# Patient Record
Sex: MALE | Race: WHITE | NOT HISPANIC OR LATINO | Employment: FULL TIME | URBAN - NONMETROPOLITAN AREA
[De-identification: names, ages, dates, MRNs, and addresses within clinical notes are randomized per-mention and may not be internally consistent; named-entity substitution may affect disease eponyms.]

---

## 2024-03-19 ENCOUNTER — OFFICE VISIT (OUTPATIENT)
Dept: URGENT CARE | Facility: CLINIC | Age: 39
End: 2024-03-19
Payer: COMMERCIAL

## 2024-03-19 VITALS
DIASTOLIC BLOOD PRESSURE: 96 MMHG | HEART RATE: 104 BPM | TEMPERATURE: 98.5 F | RESPIRATION RATE: 16 BRPM | SYSTOLIC BLOOD PRESSURE: 145 MMHG | OXYGEN SATURATION: 97 % | BODY MASS INDEX: 31.39 KG/M2 | WEIGHT: 200 LBS | HEIGHT: 67 IN

## 2024-03-19 DIAGNOSIS — A08.4 VIRAL GASTROENTERITIS: Primary | ICD-10-CM

## 2024-03-19 LAB
POC APPEARANCE, URINE: CLEAR
POC BILIRUBIN, URINE: ABNORMAL
POC BLOOD, URINE: NEGATIVE
POC COLOR, URINE: YELLOW
POC GLUCOSE, URINE: NEGATIVE MG/DL
POC KETONES, URINE: ABNORMAL MG/DL
POC LEUKOCYTES, URINE: NEGATIVE
POC NITRITE,URINE: NEGATIVE
POC PH, URINE: 7 PH
POC PROTEIN, URINE: ABNORMAL MG/DL
POC SPECIFIC GRAVITY, URINE: 1.02
POC UROBILINOGEN, URINE: 4 EU/DL

## 2024-03-19 PROCEDURE — 81002 URINALYSIS NONAUTO W/O SCOPE: CPT | Performed by: NURSE PRACTITIONER

## 2024-03-19 PROCEDURE — 99213 OFFICE O/P EST LOW 20 MIN: CPT | Performed by: NURSE PRACTITIONER

## 2024-03-19 NOTE — PROGRESS NOTES
38 y.o. male presents for evaluation of diarrhea and intermittent abdominal pain that began 2 days ago. States he had low grade fever at onset but has resolved today. Denies persistent pain. On Sunday he states he was straining to have a BM as he was bloated and then developed a pain to his left lower abdomen that comes and goes. He states he has generalized pain that begins just prior to BM and resolves with BM. He denies melena, vomiting, URI symptoms or any other associated symptoms. Has been able to tolerate fluids but states intake has been down. No otc meds for symptoms. No other complaints.    Vitals:    03/19/24 0956   BP: (!) 145/96   Pulse: 104   Resp: 16   Temp: 36.9 °C (98.5 °F)   SpO2: 97%       No Known Allergies    Medication Documentation Review Audit       Reviewed by Janiya Crawford MA (Medical Assistant) on 03/19/24 at 0955      Medication Order Taking? Sig Documenting Provider Last Dose Status            No Medications to Display                                   No past medical history on file.    No past surgical history on file.    ROS  See HPI    Physical Exam  Vitals and nursing note reviewed.   Constitutional:       General: He is not in acute distress.     Appearance: Normal appearance. He is not ill-appearing, toxic-appearing or diaphoretic.   HENT:      Head: Normocephalic and atraumatic.   Cardiovascular:      Rate and Rhythm: Regular rhythm. Tachycardia present.   Abdominal:      General: Bowel sounds are increased.      Palpations: Abdomen is soft.      Tenderness: There is generalized abdominal tenderness. There is no right CVA tenderness or left CVA tenderness. Negative signs include Mcallister's sign and McBurney's sign.      Hernia: No hernia is present.   Skin:     General: Skin is warm and dry.   Neurological:      General: No focal deficit present.      Mental Status: He is alert and oriented to person, place, and time.   Psychiatric:         Mood and Affect: Mood normal.          Behavior: Behavior normal.       Recent Results (from the past 1 hour(s))   POCT UA (nonautomated w/o microscopy) manually resulted    Collection Time: 03/19/24 10:29 AM   Result Value Ref Range    POC Color, Urine Yellow Straw, Yellow, Light-Yellow    POC Appearance, Urine Clear Clear    POC Glucose, Urine NEGATIVE NEGATIVE mg/dl    POC Bilirubin, Urine SMALL (1+) (A) NEGATIVE    POC Ketones, Urine TRACE (A) NEGATIVE mg/dl    POC Specific Gravity, Urine 1.020 1.005 - 1.035    POC Blood, Urine NEGATIVE NEGATIVE    POC PH, Urine 7.0 No Reference Range Established PH    POC Protein, Urine 100 (2+) (A) NEGATIVE, 30 (1+) mg/dl    POC Urobilinogen, Urine 4.0 (A) 0.2, 1.0 EU/DL    Poc Nitrite, Urine NEGATIVE NEGATIVE    POC Leukocytes, Urine NEGATIVE NEGATIVE     Assessment/Plan/MDM  Ramesh was seen today for diarrhea.  Diagnoses and all orders for this visit:  Viral gastroenteritis (Primary)  -     POCT UA (nonautomated w/o microscopy) manually resulted  -     Urine Culture    Advised patient and wife if symptoms become persistent, worsen, fever returns, or any new symptoms arise to go to ED for further eval. They agreed with plan. Patient's clinical presentation is otherwise unremarkable at this time. Patient is discharged with instructions to follow-up with primary care or seek emergency medical attention for worsening symptoms or any new concerns.      Lester Loya, CNP  [facility]  [phone]

## 2024-03-21 ENCOUNTER — APPOINTMENT (OUTPATIENT)
Dept: RADIOLOGY | Facility: HOSPITAL | Age: 39
DRG: 391 | End: 2024-03-21
Payer: COMMERCIAL

## 2024-03-21 ENCOUNTER — HOSPITAL ENCOUNTER (INPATIENT)
Facility: HOSPITAL | Age: 39
LOS: 5 days | Discharge: HOME | DRG: 391 | End: 2024-03-26
Attending: EMERGENCY MEDICINE
Payer: COMMERCIAL

## 2024-03-21 DIAGNOSIS — K57.20 PERFORATION OF SIGMOID COLON DUE TO DIVERTICULITIS: ICD-10-CM

## 2024-03-21 DIAGNOSIS — K65.1 INTRA-ABDOMINAL ABSCESS (MULTI): Primary | ICD-10-CM

## 2024-03-21 LAB
ALBUMIN SERPL BCP-MCNC: 3.9 G/DL (ref 3.4–5)
ALP SERPL-CCNC: 69 U/L (ref 33–120)
ALT SERPL W P-5'-P-CCNC: 30 U/L (ref 10–52)
ANION GAP SERPL CALC-SCNC: 14 MMOL/L (ref 10–20)
APPEARANCE UR: ABNORMAL
AST SERPL W P-5'-P-CCNC: 29 U/L (ref 9–39)
BACTERIA #/AREA URNS AUTO: ABNORMAL /HPF
BASOPHILS # BLD AUTO: 0.02 X10*3/UL (ref 0–0.1)
BASOPHILS NFR BLD AUTO: 0.1 %
BILIRUB SERPL-MCNC: 1 MG/DL (ref 0–1.2)
BILIRUB UR STRIP.AUTO-MCNC: NEGATIVE MG/DL
BUN SERPL-MCNC: 11 MG/DL (ref 6–23)
CALCIUM SERPL-MCNC: 8.9 MG/DL (ref 8.6–10.3)
CHLORIDE SERPL-SCNC: 100 MMOL/L (ref 98–107)
CO2 SERPL-SCNC: 21 MMOL/L (ref 21–32)
COLOR UR: YELLOW
CREAT SERPL-MCNC: 0.72 MG/DL (ref 0.5–1.3)
EGFRCR SERPLBLD CKD-EPI 2021: >90 ML/MIN/1.73M*2
EOSINOPHIL # BLD AUTO: 0.03 X10*3/UL (ref 0–0.7)
EOSINOPHIL NFR BLD AUTO: 0.2 %
ERYTHROCYTE [DISTWIDTH] IN BLOOD BY AUTOMATED COUNT: 11.1 % (ref 11.5–14.5)
GLUCOSE SERPL-MCNC: 97 MG/DL (ref 74–99)
GLUCOSE UR STRIP.AUTO-MCNC: NEGATIVE MG/DL
HCT VFR BLD AUTO: 44.9 % (ref 41–52)
HGB BLD-MCNC: 16.2 G/DL (ref 13.5–17.5)
HOLD SPECIMEN: NORMAL
HYALINE CASTS #/AREA URNS AUTO: ABNORMAL /LPF
IMM GRANULOCYTES # BLD AUTO: 0.06 X10*3/UL (ref 0–0.7)
IMM GRANULOCYTES NFR BLD AUTO: 0.4 % (ref 0–0.9)
KETONES UR STRIP.AUTO-MCNC: ABNORMAL MG/DL
LACTATE SERPL-SCNC: 1 MMOL/L (ref 0.4–2)
LEUKOCYTE ESTERASE UR QL STRIP.AUTO: NEGATIVE
LIPASE SERPL-CCNC: 8 U/L (ref 9–82)
LYMPHOCYTES # BLD AUTO: 1.32 X10*3/UL (ref 1.2–4.8)
LYMPHOCYTES NFR BLD AUTO: 8.4 %
MCH RBC QN AUTO: 32.7 PG (ref 26–34)
MCHC RBC AUTO-ENTMCNC: 36.1 G/DL (ref 32–36)
MCV RBC AUTO: 91 FL (ref 80–100)
MONOCYTES # BLD AUTO: 1.24 X10*3/UL (ref 0.1–1)
MONOCYTES NFR BLD AUTO: 7.9 %
MUCOUS THREADS #/AREA URNS AUTO: ABNORMAL /LPF
NEUTROPHILS # BLD AUTO: 12.99 X10*3/UL (ref 1.2–7.7)
NEUTROPHILS NFR BLD AUTO: 83 %
NITRITE UR QL STRIP.AUTO: NEGATIVE
NRBC BLD-RTO: 0 /100 WBCS (ref 0–0)
PH UR STRIP.AUTO: 6 [PH]
PLATELET # BLD AUTO: 269 X10*3/UL (ref 150–450)
POTASSIUM SERPL-SCNC: 3.3 MMOL/L (ref 3.5–5.3)
PROT SERPL-MCNC: 8 G/DL (ref 6.4–8.2)
PROT UR STRIP.AUTO-MCNC: ABNORMAL MG/DL
RBC # BLD AUTO: 4.95 X10*6/UL (ref 4.5–5.9)
RBC # UR STRIP.AUTO: NEGATIVE /UL
RBC #/AREA URNS AUTO: ABNORMAL /HPF
SODIUM SERPL-SCNC: 132 MMOL/L (ref 136–145)
SP GR UR STRIP.AUTO: 1.02
UROBILINOGEN UR STRIP.AUTO-MCNC: 4 MG/DL
WBC # BLD AUTO: 15.7 X10*3/UL (ref 4.4–11.3)
WBC #/AREA URNS AUTO: ABNORMAL /HPF

## 2024-03-21 PROCEDURE — 74177 CT ABD & PELVIS W/CONTRAST: CPT | Performed by: RADIOLOGY

## 2024-03-21 PROCEDURE — 83690 ASSAY OF LIPASE: CPT | Performed by: EMERGENCY MEDICINE

## 2024-03-21 PROCEDURE — 99285 EMERGENCY DEPT VISIT HI MDM: CPT | Mod: 25

## 2024-03-21 PROCEDURE — 2500000004 HC RX 250 GENERAL PHARMACY W/ HCPCS (ALT 636 FOR OP/ED)

## 2024-03-21 PROCEDURE — 36415 COLL VENOUS BLD VENIPUNCTURE: CPT | Performed by: EMERGENCY MEDICINE

## 2024-03-21 PROCEDURE — 85025 COMPLETE CBC W/AUTO DIFF WBC: CPT | Performed by: EMERGENCY MEDICINE

## 2024-03-21 PROCEDURE — 96375 TX/PRO/DX INJ NEW DRUG ADDON: CPT

## 2024-03-21 PROCEDURE — 74177 CT ABD & PELVIS W/CONTRAST: CPT

## 2024-03-21 PROCEDURE — 2550000001 HC RX 255 CONTRASTS: Performed by: EMERGENCY MEDICINE

## 2024-03-21 PROCEDURE — 80053 COMPREHEN METABOLIC PANEL: CPT | Performed by: EMERGENCY MEDICINE

## 2024-03-21 PROCEDURE — 99222 1ST HOSP IP/OBS MODERATE 55: CPT | Performed by: SURGERY

## 2024-03-21 PROCEDURE — 83605 ASSAY OF LACTIC ACID: CPT | Performed by: EMERGENCY MEDICINE

## 2024-03-21 PROCEDURE — 2500000004 HC RX 250 GENERAL PHARMACY W/ HCPCS (ALT 636 FOR OP/ED): Performed by: EMERGENCY MEDICINE

## 2024-03-21 PROCEDURE — 96365 THER/PROPH/DIAG IV INF INIT: CPT | Mod: 59

## 2024-03-21 PROCEDURE — 1100000001 HC PRIVATE ROOM DAILY

## 2024-03-21 PROCEDURE — 81001 URINALYSIS AUTO W/SCOPE: CPT | Performed by: EMERGENCY MEDICINE

## 2024-03-21 PROCEDURE — 99222 1ST HOSP IP/OBS MODERATE 55: CPT

## 2024-03-21 RX ORDER — ENOXAPARIN SODIUM 100 MG/ML
40 INJECTION SUBCUTANEOUS EVERY 24 HOURS
Status: DISCONTINUED | OUTPATIENT
Start: 2024-03-21 | End: 2024-03-26 | Stop reason: HOSPADM

## 2024-03-21 RX ORDER — PANTOPRAZOLE SODIUM 40 MG/10ML
40 INJECTION, POWDER, LYOPHILIZED, FOR SOLUTION INTRAVENOUS
Status: DISCONTINUED | OUTPATIENT
Start: 2024-03-22 | End: 2024-03-26 | Stop reason: HOSPADM

## 2024-03-21 RX ORDER — ONDANSETRON HYDROCHLORIDE 2 MG/ML
4 INJECTION, SOLUTION INTRAVENOUS ONCE
Status: COMPLETED | OUTPATIENT
Start: 2024-03-21 | End: 2024-03-21

## 2024-03-21 RX ORDER — ACETAMINOPHEN 325 MG/1
650 TABLET ORAL EVERY 4 HOURS PRN
Status: DISCONTINUED | OUTPATIENT
Start: 2024-03-21 | End: 2024-03-26 | Stop reason: HOSPADM

## 2024-03-21 RX ORDER — SODIUM CHLORIDE 9 MG/ML
150 INJECTION, SOLUTION INTRAVENOUS CONTINUOUS
Status: DISCONTINUED | OUTPATIENT
Start: 2024-03-21 | End: 2024-03-21

## 2024-03-21 RX ORDER — MORPHINE SULFATE 4 MG/ML
4 INJECTION, SOLUTION INTRAMUSCULAR; INTRAVENOUS ONCE
Status: COMPLETED | OUTPATIENT
Start: 2024-03-21 | End: 2024-03-21

## 2024-03-21 RX ORDER — ONDANSETRON 4 MG/1
4 TABLET, ORALLY DISINTEGRATING ORAL EVERY 8 HOURS PRN
Status: DISCONTINUED | OUTPATIENT
Start: 2024-03-21 | End: 2024-03-26 | Stop reason: HOSPADM

## 2024-03-21 RX ORDER — POTASSIUM CHLORIDE 14.9 MG/ML
20 INJECTION INTRAVENOUS
Status: COMPLETED | OUTPATIENT
Start: 2024-03-21 | End: 2024-03-21

## 2024-03-21 RX ORDER — MORPHINE SULFATE 2 MG/ML
2 INJECTION, SOLUTION INTRAMUSCULAR; INTRAVENOUS EVERY 4 HOURS PRN
Status: DISCONTINUED | OUTPATIENT
Start: 2024-03-21 | End: 2024-03-26 | Stop reason: HOSPADM

## 2024-03-21 RX ORDER — SODIUM CHLORIDE 9 MG/ML
125 INJECTION, SOLUTION INTRAVENOUS CONTINUOUS
Status: DISCONTINUED | OUTPATIENT
Start: 2024-03-21 | End: 2024-03-25

## 2024-03-21 RX ORDER — ONDANSETRON HYDROCHLORIDE 2 MG/ML
4 INJECTION, SOLUTION INTRAVENOUS EVERY 8 HOURS PRN
Status: DISCONTINUED | OUTPATIENT
Start: 2024-03-21 | End: 2024-03-26 | Stop reason: HOSPADM

## 2024-03-21 RX ORDER — KETOROLAC TROMETHAMINE 30 MG/ML
30 INJECTION, SOLUTION INTRAMUSCULAR; INTRAVENOUS EVERY 6 HOURS PRN
Status: DISPENSED | OUTPATIENT
Start: 2024-03-21 | End: 2024-03-26

## 2024-03-21 RX ADMIN — SODIUM CHLORIDE 125 ML/HR: 9 INJECTION, SOLUTION INTRAVENOUS at 19:24

## 2024-03-21 RX ADMIN — IOHEXOL 75 ML: 350 INJECTION, SOLUTION INTRAVENOUS at 09:51

## 2024-03-21 RX ADMIN — ONDANSETRON 4 MG: 2 INJECTION INTRAMUSCULAR; INTRAVENOUS at 08:48

## 2024-03-21 RX ADMIN — POTASSIUM CHLORIDE 20 MEQ: 14.9 INJECTION, SOLUTION INTRAVENOUS at 12:37

## 2024-03-21 RX ADMIN — PIPERACILLIN SODIUM AND TAZOBACTAM SODIUM 3.38 G: 3; .375 INJECTION, SOLUTION INTRAVENOUS at 10:30

## 2024-03-21 RX ADMIN — MORPHINE SULFATE 4 MG: 4 INJECTION, SOLUTION INTRAMUSCULAR; INTRAVENOUS at 08:48

## 2024-03-21 RX ADMIN — PIPERACILLIN SODIUM AND TAZOBACTAM SODIUM 3.38 G: 3; .375 INJECTION, SOLUTION INTRAVENOUS at 17:15

## 2024-03-21 RX ADMIN — SODIUM CHLORIDE 150 ML/HR: 9 INJECTION, SOLUTION INTRAVENOUS at 08:40

## 2024-03-21 RX ADMIN — KETOROLAC TROMETHAMINE 30 MG: 30 INJECTION, SOLUTION INTRAMUSCULAR; INTRAVENOUS at 15:32

## 2024-03-21 RX ADMIN — PIPERACILLIN SODIUM AND TAZOBACTAM SODIUM 3.38 G: 3; .375 INJECTION, SOLUTION INTRAVENOUS at 22:22

## 2024-03-21 RX ADMIN — POTASSIUM CHLORIDE 20 MEQ: 14.9 INJECTION, SOLUTION INTRAVENOUS at 14:21

## 2024-03-21 RX ADMIN — SODIUM CHLORIDE 1000 ML: 9 INJECTION, SOLUTION INTRAVENOUS at 08:49

## 2024-03-21 SDOH — SOCIAL STABILITY: SOCIAL INSECURITY: DO YOU FEEL ANYONE HAS EXPLOITED OR TAKEN ADVANTAGE OF YOU FINANCIALLY OR OF YOUR PERSONAL PROPERTY?: NO

## 2024-03-21 SDOH — SOCIAL STABILITY: SOCIAL INSECURITY: ARE YOU OR HAVE YOU BEEN THREATENED OR ABUSED PHYSICALLY, EMOTIONALLY, OR SEXUALLY BY ANYONE?: NO

## 2024-03-21 SDOH — SOCIAL STABILITY: SOCIAL INSECURITY: HAVE YOU HAD THOUGHTS OF HARMING ANYONE ELSE?: NO

## 2024-03-21 SDOH — SOCIAL STABILITY: SOCIAL INSECURITY: HAS ANYONE EVER THREATENED TO HURT YOUR FAMILY OR YOUR PETS?: NO

## 2024-03-21 SDOH — SOCIAL STABILITY: SOCIAL INSECURITY: ABUSE: ADULT

## 2024-03-21 SDOH — SOCIAL STABILITY: SOCIAL INSECURITY: ARE THERE ANY APPARENT SIGNS OF INJURIES/BEHAVIORS THAT COULD BE RELATED TO ABUSE/NEGLECT?: NO

## 2024-03-21 SDOH — SOCIAL STABILITY: SOCIAL INSECURITY: DOES ANYONE TRY TO KEEP YOU FROM HAVING/CONTACTING OTHER FRIENDS OR DOING THINGS OUTSIDE YOUR HOME?: NO

## 2024-03-21 SDOH — SOCIAL STABILITY: SOCIAL INSECURITY: DO YOU FEEL UNSAFE GOING BACK TO THE PLACE WHERE YOU ARE LIVING?: NO

## 2024-03-21 SDOH — SOCIAL STABILITY: SOCIAL INSECURITY: WERE YOU ABLE TO COMPLETE ALL THE BEHAVIORAL HEALTH SCREENINGS?: YES

## 2024-03-21 ASSESSMENT — PAIN DESCRIPTION - PAIN TYPE
TYPE: ACUTE PAIN

## 2024-03-21 ASSESSMENT — ENCOUNTER SYMPTOMS
PSYCHIATRIC NEGATIVE: 1
DIARRHEA: 1
NEUROLOGICAL NEGATIVE: 1
ABDOMINAL DISTENTION: 1
APPETITE CHANGE: 1
ABDOMINAL PAIN: 1
CARDIOVASCULAR NEGATIVE: 1
MUSCULOSKELETAL NEGATIVE: 1
RESPIRATORY NEGATIVE: 1

## 2024-03-21 ASSESSMENT — COGNITIVE AND FUNCTIONAL STATUS - GENERAL
MOBILITY SCORE: 24
DAILY ACTIVITIY SCORE: 24
PATIENT BASELINE BEDBOUND: NO
DAILY ACTIVITIY SCORE: 24
MOBILITY SCORE: 24

## 2024-03-21 ASSESSMENT — PAIN DESCRIPTION - DESCRIPTORS
DESCRIPTORS: ACHING

## 2024-03-21 ASSESSMENT — PAIN DESCRIPTION - LOCATION
LOCATION: ABDOMEN

## 2024-03-21 ASSESSMENT — PAIN SCALES - GENERAL
PAINLEVEL_OUTOF10: 2
PAINLEVEL_OUTOF10: 0 - NO PAIN
PAINLEVEL_OUTOF10: 5 - MODERATE PAIN
PAINLEVEL_OUTOF10: 6
PAINLEVEL_OUTOF10: 0 - NO PAIN

## 2024-03-21 ASSESSMENT — LIFESTYLE VARIABLES
HOW OFTEN DO YOU HAVE A DRINK CONTAINING ALCOHOL: 4 OR MORE TIMES A WEEK
SKIP TO QUESTIONS 9-10: 0
AUDIT-C TOTAL SCORE: 10
HOW OFTEN DO YOU HAVE 6 OR MORE DRINKS ON ONE OCCASION: DAILY OR ALMOST DAILY
AUDIT-C TOTAL SCORE: 10
HOW MANY STANDARD DRINKS CONTAINING ALCOHOL DO YOU HAVE ON A TYPICAL DAY: 5 OR 6

## 2024-03-21 ASSESSMENT — PAIN - FUNCTIONAL ASSESSMENT
PAIN_FUNCTIONAL_ASSESSMENT: 0-10

## 2024-03-21 ASSESSMENT — ACTIVITIES OF DAILY LIVING (ADL)
PATIENT'S MEMORY ADEQUATE TO SAFELY COMPLETE DAILY ACTIVITIES?: YES
GROOMING: INDEPENDENT
TOILETING: INDEPENDENT
ADEQUATE_TO_COMPLETE_ADL: YES
DRESSING YOURSELF: INDEPENDENT
BATHING: INDEPENDENT
HEARING - LEFT EAR: FUNCTIONAL
WALKS IN HOME: INDEPENDENT
HEARING - RIGHT EAR: FUNCTIONAL
JUDGMENT_ADEQUATE_SAFELY_COMPLETE_DAILY_ACTIVITIES: YES
LACK_OF_TRANSPORTATION: NO
ASSISTIVE_DEVICE: EYEGLASSES
FEEDING YOURSELF: INDEPENDENT

## 2024-03-21 ASSESSMENT — COLUMBIA-SUICIDE SEVERITY RATING SCALE - C-SSRS
1. IN THE PAST MONTH, HAVE YOU WISHED YOU WERE DEAD OR WISHED YOU COULD GO TO SLEEP AND NOT WAKE UP?: NO
6. HAVE YOU EVER DONE ANYTHING, STARTED TO DO ANYTHING, OR PREPARED TO DO ANYTHING TO END YOUR LIFE?: NO
2. HAVE YOU ACTUALLY HAD ANY THOUGHTS OF KILLING YOURSELF?: NO

## 2024-03-21 ASSESSMENT — PAIN DESCRIPTION - ORIENTATION
ORIENTATION: LOWER;MID

## 2024-03-21 ASSESSMENT — VISUAL ACUITY: OU: 1

## 2024-03-21 ASSESSMENT — PATIENT HEALTH QUESTIONNAIRE - PHQ9
2. FEELING DOWN, DEPRESSED OR HOPELESS: NOT AT ALL
1. LITTLE INTEREST OR PLEASURE IN DOING THINGS: NOT AT ALL
SUM OF ALL RESPONSES TO PHQ9 QUESTIONS 1 & 2: 0

## 2024-03-21 NOTE — ED PROVIDER NOTES
Abdominal pain.  This 38-year-old white male presents to the ED with complaints of abdominal pain symptoms that began on Saturday evening he states the pain has been relatively constant and most of the pain is located primarily in the left lower quadrant of the abdomen.  He states that with this abdominal pain he is having  the urge to have a bowel movement.  He has been treating himself with MiraLAX and ibuprofen with small bowel movements.  Patient states that when he has a bowel movement he does have some relief of his abdominal pain symptoms.  Though he states that the pain has been relatively constant.  Patient states that he has had issues with abdominal pain in the past with eating peanuts, pizza dough and barbecue meat.  He states that he will develop abdominal pain, fever and when she has a bowel movement his symptoms then resolved.  He denies ever having colonoscopy or seen a specialist for the symptoms previously.  He denies any history of diverticulitis.  Patient is noted to be hypertensive on arrival to the ED though he denies any endorgan symptoms.  He states that he has not been seen by primary care doctor for quite some time.  Denies any previous surgical history of the abdomen.            History provided by:  Patient   used: No         Physical Exam  Vitals and nursing note reviewed.   Constitutional:       General: He is awake.      Appearance: Normal appearance. He is overweight.   HENT:      Head: Normocephalic and atraumatic.      Right Ear: Hearing and external ear normal.      Left Ear: Hearing and external ear normal.      Nose: Nose normal. No congestion or rhinorrhea.      Mouth/Throat:      Lips: Pink.      Mouth: Mucous membranes are moist.      Pharynx: Oropharynx is clear. No oropharyngeal exudate or posterior oropharyngeal erythema.   Eyes:      General: Lids are normal. Vision grossly intact.         Right eye: No discharge.         Left eye: No discharge.       Extraocular Movements: Extraocular movements intact.      Conjunctiva/sclera: Conjunctivae normal.      Pupils: Pupils are equal, round, and reactive to light.   Cardiovascular:      Rate and Rhythm: Normal rate and regular rhythm.      Pulses: Normal pulses.      Heart sounds: Normal heart sounds. No murmur heard.     No friction rub. No gallop.   Pulmonary:      Effort: Pulmonary effort is normal. No respiratory distress.      Breath sounds: Normal breath sounds. No stridor. No wheezing, rhonchi or rales.   Chest:      Chest wall: No tenderness.   Abdominal:      General: Abdomen is protuberant. Bowel sounds are normal. There is no distension.      Palpations: Abdomen is soft. There is no mass.      Tenderness: There is abdominal tenderness in the suprapubic area and left lower quadrant. There is no guarding or rebound.      Hernia: No hernia is present.       Musculoskeletal:         General: No swelling, tenderness, deformity or signs of injury. Normal range of motion.      Cervical back: Full passive range of motion without pain, normal range of motion and neck supple.      Right lower leg: Normal. No edema.      Left lower leg: Normal. No edema.   Skin:     General: Skin is warm and dry.      Capillary Refill: Capillary refill takes less than 2 seconds.      Coloration: Skin is not jaundiced or pale.      Findings: No bruising, erythema, lesion or rash.   Neurological:      General: No focal deficit present.      Mental Status: He is alert and oriented to person, place, and time.      GCS: GCS eye subscore is 4. GCS verbal subscore is 5. GCS motor subscore is 6.      Cranial Nerves: Cranial nerves 2-12 are intact. No cranial nerve deficit.      Sensory: Sensation is intact. No sensory deficit.      Motor: Motor function is intact. No weakness.      Coordination: Coordination is intact. Coordination normal.      Deep Tendon Reflexes: Reflexes normal.   Psychiatric:         Attention and Perception: Attention  and perception normal.         Mood and Affect: Mood and affect normal.         Speech: Speech normal.         Behavior: Behavior normal. Behavior is cooperative.         Thought Content: Thought content normal.         Cognition and Memory: Cognition and memory normal.         Judgment: Judgment normal.          Labs Reviewed   LIPASE - Abnormal       Result Value    Lipase 8 (*)     Narrative:     Venipuncture immediately after or during the administration of Metamizole may lead to falsely low results. Testing should be performed immediately prior to Metamizole dosing.   COMPREHENSIVE METABOLIC PANEL - Abnormal    Glucose 97      Sodium 132 (*)     Potassium 3.3 (*)     Chloride 100      Bicarbonate 21      Anion Gap 14      Urea Nitrogen 11      Creatinine 0.72      eGFR >90      Calcium 8.9      Albumin 3.9      Alkaline Phosphatase 69      Total Protein 8.0      AST 29      Bilirubin, Total 1.0      ALT 30     CBC WITH AUTO DIFFERENTIAL - Abnormal    WBC 15.7 (*)     nRBC 0.0      RBC 4.95      Hemoglobin 16.2      Hematocrit 44.9      MCV 91      MCH 32.7      MCHC 36.1 (*)     RDW 11.1 (*)     Platelets 269      Neutrophils % 83.0      Immature Granulocytes %, Automated 0.4      Lymphocytes % 8.4      Monocytes % 7.9      Eosinophils % 0.2      Basophils % 0.1      Neutrophils Absolute 12.99 (*)     Immature Granulocytes Absolute, Automated 0.06      Lymphocytes Absolute 1.32      Monocytes Absolute 1.24 (*)     Eosinophils Absolute 0.03      Basophils Absolute 0.02     URINALYSIS WITH REFLEX CULTURE AND MICROSCOPIC - Abnormal    Color, Urine Yellow      Appearance, Urine Hazy (*)     Specific Gravity, Urine 1.018      pH, Urine 6.0      Protein, Urine 30 (1+) (*)     Glucose, Urine NEGATIVE      Blood, Urine NEGATIVE      Ketones, Urine 80 (2+) (*)     Bilirubin, Urine NEGATIVE      Urobilinogen, Urine 4.0 (*)     Nitrite, Urine NEGATIVE      Leukocyte Esterase, Urine NEGATIVE     URINALYSIS MICROSCOPIC WITH  REFLEX CULTURE - Abnormal    WBC, Urine 1-5      RBC, Urine 1-2      Bacteria, Urine 1+ (*)     Mucus, Urine 4+      Hyaline Casts, Urine 1+ (*)    LACTATE - Normal    Lactate 1.0      Narrative:     Venipuncture immediately after or during the administration of Metamizole may lead to falsely low results. Testing should be performed immediately  prior to Metamizole dosing.   URINALYSIS WITH REFLEX CULTURE AND MICROSCOPIC    Narrative:     The following orders were created for panel order Urinalysis with Reflex Culture and Microscopic.  Procedure                               Abnormality         Status                     ---------                               -----------         ------                     Urinalysis with Reflex C...[[id number]]  Abnormal            Final result               Extra Urine Gray Tube[[id number]]                            In process                   Please view results for these tests on the individual orders.   EXTRA URINE GRAY TUBE        CT abdomen pelvis w IV contrast   Final Result   Findings consistent with severe acute diverticulitis of the sigmoid   colon with microperforation, small amount of free fluid and   inflammatory stranding extending along the left pelvic sidewall and   pericolic gutter.        Diffuse bladder wall thickening may be related to poor distention,   inflammation secondary to adjacent presumed diverticulitis and/or   primary cystitis. Clinical correlation suggested.        Additional findings as described above.        MACRO:   None.        Signed by: Yenny Blackman 3/21/2024 10:06 AM   Dictation workstation:   [id number]           Procedures     Medical Decision Making  Patient was seen and evaluated due to complaints of abdominal pain primarily in the suprapubic and left lower quadrant.  Concern for possible diverticulitis and patient was worked up for this diagnosis including lab work and a CT scan of the abdomen pelvis.  Lab work did reveal a  leukocytosis of 15.7.  Potassium was low at 3.3.  The patient's CAT scan revealed evidence of severe acute diverticulitis of the sigmoid colon with microperforation.  Due to the microperforation I made a consult with Dr. Han.  She recommended the patient be admitted to the medical service with IV Zosyn and n.p.o. status and she will follow-up with the patient later this afternoon.         Diagnoses as of 03/21/24 1207   Perforation of sigmoid colon due to diverticulitis                    Jatinder Mcfadden,   03/21/24 1207

## 2024-03-21 NOTE — CONSULTS
General Surgery Consultation    Patient: Ramesh Amaya  : 1985  MRN: [id number]  Date of Consultation: 24    Primary Care Provider: No PCP  Referring Provider: Dr. Jatinder Mcfadden    Chief Complaint: Abdominal pain    History of Present Illness: Ramesh Amaya is a 38 y.o. old male seen for evaluation of diverticulitis with microperforation.  He presented to the emergency department this morning with abdominal pain.  Pain began 5 days ago.  He did not measure a fever, but reports feeling hot and then chills.  He had associated nausea.  He was evaluated at urgent care where he was thought to have had a viral infection and instructed to take ibuprofen.  His pain persisted.  He has had some episodes of lower abdominal discomfort in the past, but nothing this severe or that had not subsided after a couple of days of decreasing his oral intake.  His pain was the worst Saturday night and .  However, he came to the ED today just because of how long it has persisted.  In the ED, he had a leukocytosis of 15.7.  CT scan of the abdomen and pelvis showed diverticulitis with severe inflammation of the sigmoid colon and microperforation with a small amount of free fluid extending along the left pelvic sidewall.  He has no previous abdominal surgery.  He has never had a colonoscopy.  He has no family history of colon or rectal cancer.    Medical History:  Diverticulitis  Asthma  Cutaneous lupus  Obesity, BMI 31    Surgical History:  No previous abdominal surgery.   No previous colonoscopy.     Home Medications:  No home medications.    Allergies:  is allergic to peanut.   Suspect that this is not a true allergy.  Patient reports that he gets the lower abdominal pain when he eats peanuts.  He has always attributed this to an allergy, but likely is just his diverticulitis.    Family History:   No family history of colon or rectal cancer, inflammatory bowel disease, or diverticulitis.    Social  "History:  Smoker.    ROS:  Constitutional: + Fever, chills  Cardiovascular: No chest pain  Respiratory: No cough or shortness of breath  Gastrointestinal: + Lower abdominal pain, initially bilateral, then worse in the left lower quadrant.  Decreased bowel movements, although continues to pass stool and flatus.  Denies any blood in the stool.  No vomiting.  Genitourinary: + Suprapubic discomfort  Musculoskeletal: no weakness or swelling  Integumentary: + Discoid lupus, on topical steroids intermittently but uses these judiciously given sun sensitivity  Neurological: no confusion  Endocrine: no heat or cold intolerance  Heme/Lymph: no easy bruising or bleeding    Objective:  /84   Pulse 89   Temp 36.6 °C (97.9 °F)   Resp 18   Ht 1.702 m (5' 7\")   Wt 90.7 kg (200 lb)   SpO2 98%   BMI 31.32 kg/m²     Physical Exam:  Constitutional: No acute distress, conversant, pleasant, girlfriend at bedside  Neurologic: alert and oriented  Psych: appropriate affect  Ears, Nose, Mouth and Throat: mucus membranes moist  Pulmonary: No labored breathing  Cardiovascular: Regular rate (high 80's) and rhythm  Abdomen: soft, nondistended, BMI 31, no surgical scars, moderately tender to palpation in the left lower quadrant and suprapubic region, nontender in the upper abdomen and right lower quadrant, no generalized peritonitis  Musculoskeletal: Moves all extremities, no edema  Skin: no jaundice    Labs:  WBC 15.7, Hgb 16.2, Plts 269  Cr 0.72, K 3.3  LA 1.0    Imaging:  CT abdomen and pelvis reviewed: Severe acute diverticulitis of the sigmoid colon with microperforation, small amount of free fluid and inflammatory stranding extending along the left pelvic sidewall and pericolic gutter.  Diffuse bladder wall thickening, likely secondary to adjacent inflammation.    Assessment and Plan: Ramseh Amaya is a 38 y.o. old male with diverticulitis with microperforation.  I recommend keeping him strict NPO and treating with IV " antibiotics.  I recommend potassium replacement.  Repeat labs in AM.  He is high risk for either developing an abscess which would need drainage, or for needing a Greg procedure.  That said, he is currently clinically stable and his pain is actually improved compared to what it was at onset 5 days ago.  Will continue to follow.  If we do get him through this without the need for surgery, he will need a follow-up colonoscopy in 8-12 weeks.    Marcella Han MD  3/21/2024

## 2024-03-21 NOTE — H&P
"History Of Present Illness  Ramesh Amaya is a 38 y.o. male with past medical history of asthma and discoid lupus presented to [facility] ED with complaints of abdominal pain.  Vitals on presentation were temp 37.1, heart rate 106, respiratory rate 16, blood pressure 192/127, and oxygen saturation 100% on room air.  Labs show sodium 132, decreased potassium at 3.3, lipase 8, and elevated white BCs 15.7.  Lactate level normal at 1.0.  Alkaline phosphatase, ALT, AST, and bilirubin within normal limits.  UA collected and negative for UTI but showed protein and ketones.  CT abdomen and pelvis with IV contrast performed showed severe acute diverticulitis of the sigmoid colon with microperforation, small amount of free fluid and inflammatory stranding extending to the left pelvic sidewall.  Emergency room spoke with Dr. Han recommendation was made to keep patient n.p.o. and continue Zosyn IV.  Patient received morphine, Zofran, Zosyn, and normal saline in the emergency room.  States he has not seen a physician in at least 5 years.  Patient admitted to telemetry with a working diagnosis of diverticulitis with microperforation    Patient sitting up in chair with significant other present.  Patient states inflammation in his intestines started Saturday evening.  Verbalizes he had a fever but did not have a thermometer available to obtain a reading.  States he was \"freezing and burning up\" with shakes and nausea and developed \"poop cramps\" to suprapubic area radiating to left lower quadrant.  Patient states he woke up on Sunday and had a very small clear liquid bowel movement and attempted to increase fluid intake.  Patient obtained temperature on Monday and read 100 °F continue to have very small amounts of clear liquid from rectum.  On Tuesday he began taking Metamucil and noted small amount of runny like diarrhea with small pieces of stool.  Temperature decreased and he presented to urgent care where he was told he " had a viral infection and was instructed to take ibuprofen.  Patient verbalized continue to pass flatus and pain would decrease after each episode.  Decided present to the emergency room with continual pain.  States he noticed he has abdominal discomfort after eating peanuts, anything with barbecue sauce, and pizza dough, but states abdominal pain usually subsides after a few days.  Currently complains of abdominal tightness when sitting still and verbalizes is tolerable with intermittent suprapubic abdominal pain with movement.  Denies nausea or vomiting.  Verbalize passing flatus helps with pain relief    10 point ROS reviewed and negative except what is listed above     Past Medical History  asthma and discoid lupus    Surgical History  No past surgical history on file.     Social History  Smokes 1 pack of cigarettes per day.  Alcohol use consists of 6 beers a day.  Denies drug use    Family History  No family history on file.     Allergies  Patient has no known allergies.    Review of Systems   Constitutional:  Positive for appetite change.   HENT: Negative.     Respiratory: Negative.     Cardiovascular: Negative.    Gastrointestinal:  Positive for abdominal distention, abdominal pain and diarrhea.   Genitourinary: Negative.    Musculoskeletal: Negative.    Skin: Negative.    Neurological: Negative.    Psychiatric/Behavioral: Negative.          Physical Exam   General Appearance: AAO x 3, not in acute distress  Skin: skin color pink, warm, and dry; no suspicious rashes or lesions  Eyes : PERRL, EOM's intact  ENT: mucous membranes pink and moist  Neck: normocephalic  Respiratory: lungs clear to auscultation anteriorly; no wheezing, rhonchi, or crackles.   Heart: regular rate and rhythm. telemetry shows sinus rhythm  Abdomen: distended but pliable, positive bowel sounds x4, soft,  tender with palpation to lower abdomen and LLQ  Extremities: no edema   Peripheral pulses: normal x4 extremities  Neuro: alert,  "coherent and conversant, no focal motor deficits  Last Recorded Vitals  Blood pressure 132/81, pulse 91, temperature 37.1 °C (98.7 °F), temperature source Temporal, resp. rate 16, height 1.702 m (5' 7\"), weight 90.7 kg (200 lb), SpO2 97 %.    Relevant Results  CT abdomen pelvis w IV contrast    Result Date: 3/21/2024  Interpreted By:  Yenny Blackman, STUDY: CT ABDOMEN PELVIS W IV CONTRAST;  3/21/2024 9:50 am   INDICATION: Signs/Symptoms:LLQ abdominal pain.   COMPARISON: None.   ACCESSION NUMBER(S): [id number]   ORDERING CLINICIAN: STEPHEN ESTRADA   TECHNIQUE: CT of the abdomen and pelvis was performed. Sagittal and coronal reconstructions were generated.    75 cc Omnipaque 350 intravenous contrast given for the exam.   FINDINGS:     ABDOMINAL ORGANS:   LIVER: No focal lesion   GALL BLADDER AND BILIARY TREE: No calcified gallstone. No significant biliary dilatation.   SPLEEN: No focal lesion   PANCREAS: No focal lesion   ADRENALS: No adrenal mass   KIDNEYS AND URETERS: No renal mass or hydronephrosis   BOWEL: The stomach is decompressed. Mild uneven small-bowel fluid distention. Prominent submucosal fat in right lower quadrant/distal ileal small bowel loops. Dots of air in nondilated cecal appendix. Marked low-density sigmoid colon wall thickening with ill-defined dense probable inflamed diverticulum along its superolateral aspect with adjacent loosely clustered extraluminal air bubbles and moderate surrounding inflammatory changes. Adjacent fascial thickening extending along the pelvic sidewall and inferior pericolic gutter. Small amount of free fluid in the cul-de-sac. No discrete extraluminal fluid collection.   PERITONEUM, RETROPERITONEUM, NODES: Again small amount of free fluid in the cul-de-sac and inflammatory changes in the left lower quadrant. Also mild thickening in the presacral space. No gross free air. Subcentimeter lymph nodes in the retroperitoneum and mesentery.   VESSELS:  The abdominal aorta is " normal in caliber. Scattered atherosclerotic calcifications.   PELVIS: Urinary bladder is partially distended with diffusely thickened wall. No gross prostate enlargement. Small prostate calcifications.   ABDOMINAL WALL: Small fat containing umbilical and right inguinal region hernias.   BONES: No definite focal concerning lytic or blastic osseous lesion. Multilevel smooth vertebral endplate depression/Schmorl's nodes and mild L5-S1 intervertebral disc space narrowing.   LOWER CHEST: No airspace consolidation or pleural effusion in the included areas.       Findings consistent with severe acute diverticulitis of the sigmoid colon with microperforation, small amount of free fluid and inflammatory stranding extending along the left pelvic sidewall and pericolic gutter.   Diffuse bladder wall thickening may be related to poor distention, inflammation secondary to adjacent presumed diverticulitis and/or primary cystitis. Clinical correlation suggested.   Additional findings as described above.   MACRO: None.   Signed by: Yenny Blackman 3/21/2024 10:06 AM Dictation workstation:   [id number]     Results for orders placed or performed during the hospital encounter of 03/21/24 (from the past 24 hour(s))   Lipase   Result Value Ref Range    Lipase 8 (L) 9 - 82 U/L   Comprehensive Metabolic Panel   Result Value Ref Range    Glucose 97 74 - 99 mg/dL    Sodium 132 (L) 136 - 145 mmol/L    Potassium 3.3 (L) 3.5 - 5.3 mmol/L    Chloride 100 98 - 107 mmol/L    Bicarbonate 21 21 - 32 mmol/L    Anion Gap 14 10 - 20 mmol/L    Urea Nitrogen 11 6 - 23 mg/dL    Creatinine 0.72 0.50 - 1.30 mg/dL    eGFR >90 >60 mL/min/1.73m*2    Calcium 8.9 8.6 - 10.3 mg/dL    Albumin 3.9 3.4 - 5.0 g/dL    Alkaline Phosphatase 69 33 - 120 U/L    Total Protein 8.0 6.4 - 8.2 g/dL    AST 29 9 - 39 U/L    Bilirubin, Total 1.0 0.0 - 1.2 mg/dL    ALT 30 10 - 52 U/L   CBC and Auto Differential   Result Value Ref Range    WBC 15.7 (H) 4.4 - 11.3 x10*3/uL    nRBC  0.0 0.0 - 0.0 /100 WBCs    RBC 4.95 4.50 - 5.90 x10*6/uL    Hemoglobin 16.2 13.5 - 17.5 g/dL    Hematocrit 44.9 41.0 - 52.0 %    MCV 91 80 - 100 fL    MCH 32.7 26.0 - 34.0 pg    MCHC 36.1 (H) 32.0 - 36.0 g/dL    RDW 11.1 (L) 11.5 - 14.5 %    Platelets 269 150 - 450 x10*3/uL    Neutrophils % 83.0 40.0 - 80.0 %    Immature Granulocytes %, Automated 0.4 0.0 - 0.9 %    Lymphocytes % 8.4 13.0 - 44.0 %    Monocytes % 7.9 2.0 - 10.0 %    Eosinophils % 0.2 0.0 - 6.0 %    Basophils % 0.1 0.0 - 2.0 %    Neutrophils Absolute 12.99 (H) 1.20 - 7.70 x10*3/uL    Immature Granulocytes Absolute, Automated 0.06 0.00 - 0.70 x10*3/uL    Lymphocytes Absolute 1.32 1.20 - 4.80 x10*3/uL    Monocytes Absolute 1.24 (H) 0.10 - 1.00 x10*3/uL    Eosinophils Absolute 0.03 0.00 - 0.70 x10*3/uL    Basophils Absolute 0.02 0.00 - 0.10 x10*3/uL   Lactate   Result Value Ref Range    Lactate 1.0 0.4 - 2.0 mmol/L   Urinalysis with Reflex Culture and Microscopic   Result Value Ref Range    Color, Urine Yellow Straw, Yellow    Appearance, Urine Hazy (N) Clear    Specific Gravity, Urine 1.018 1.005 - 1.035    pH, Urine 6.0 5.0, 5.5, 6.0, 6.5, 7.0, 7.5, 8.0    Protein, Urine 30 (1+) (N) NEGATIVE mg/dL    Glucose, Urine NEGATIVE NEGATIVE mg/dL    Blood, Urine NEGATIVE NEGATIVE    Ketones, Urine 80 (2+) (A) NEGATIVE mg/dL    Bilirubin, Urine NEGATIVE NEGATIVE    Urobilinogen, Urine 4.0 (N) <2.0 mg/dL    Nitrite, Urine NEGATIVE NEGATIVE    Leukocyte Esterase, Urine NEGATIVE NEGATIVE   Urinalysis Microscopic   Result Value Ref Range    WBC, Urine 1-5 1-5, NONE /HPF    RBC, Urine 1-2 NONE, 1-2, 3-5 /HPF    Bacteria, Urine 1+ (A) NONE SEEN /HPF    Mucus, Urine 4+ Reference range not established. /LPF    Hyaline Casts, Urine 1+ (A) NONE /LPF     Scheduled medications     Continuous medications  sodium chloride 0.9%, 150 mL/hr, Last Rate: 150 mL/hr (03/21/24 0840)      PRN medications     Assessment/Plan   Principal Problem:    Perforation of sigmoid colon due to  diverticulitis    Ramesh Amaya is a 38 y.o. male with past medical history of asthma and discoid lupus presented to [facility] ED with complaints of abdominal pain. UA collected and negative for UTI but showed protein and ketones.  CT abdomen and pelvis with IV contrast performed showed severe acute diverticulitis of the sigmoid colon with microperforation, small amount of free fluid and inflammatory stranding extending to the left pelvic sidewall.  Emergency room spoke with Dr. Han recommendation was made to keep patient n.p.o. and continue Zosyn IV.  Patient states he has not seen a physician in at least 5 years. Patient admitted to telemetry with a working diagnosis of diverticulitis with microperforation    Plan:  Diverticulitis with microperforation  -Dr Han consulted- appreciate recommendations  -continue NPO  -decrease in abdominal pain  -continue zosyn  -continue telemetry  -continue IV fluids  -consulted dietitian for education. Significant other and patient asking for diet recommendations after discharge  -patient has decrease in pain. Pain management as needed    Hypokalemia  -potassium replaced for K 3.3    Leukocytosis  -continue zosyn  -labs in AM    ETOH abuse  -monitor for withdrawal. Will hold off on CIWA at this time. Drinks 6 beers daily. Verbalizes large amount of stress    Discharge disposition:  anticipate discharge home when clinically stable    I spent 40 minutes in the professional and overall care of this patient.      STEPHANIE Woodall-CNP

## 2024-03-22 LAB
ALBUMIN SERPL BCP-MCNC: 3.3 G/DL (ref 3.4–5)
ALP SERPL-CCNC: 57 U/L (ref 33–120)
ALT SERPL W P-5'-P-CCNC: 25 U/L (ref 10–52)
ANION GAP SERPL CALC-SCNC: 13 MMOL/L (ref 10–20)
AST SERPL W P-5'-P-CCNC: 25 U/L (ref 9–39)
BILIRUB SERPL-MCNC: 0.8 MG/DL (ref 0–1.2)
BUN SERPL-MCNC: 15 MG/DL (ref 6–23)
CALCIUM SERPL-MCNC: 8.1 MG/DL (ref 8.6–10.3)
CHLORIDE SERPL-SCNC: 104 MMOL/L (ref 98–107)
CO2 SERPL-SCNC: 22 MMOL/L (ref 21–32)
CREAT SERPL-MCNC: 0.8 MG/DL (ref 0.5–1.3)
EGFRCR SERPLBLD CKD-EPI 2021: >90 ML/MIN/1.73M*2
ERYTHROCYTE [DISTWIDTH] IN BLOOD BY AUTOMATED COUNT: 11.3 % (ref 11.5–14.5)
GLUCOSE SERPL-MCNC: 84 MG/DL (ref 74–99)
HCT VFR BLD AUTO: 40.9 % (ref 41–52)
HGB BLD-MCNC: 14.5 G/DL (ref 13.5–17.5)
MCH RBC QN AUTO: 32.7 PG (ref 26–34)
MCHC RBC AUTO-ENTMCNC: 35.5 G/DL (ref 32–36)
MCV RBC AUTO: 92 FL (ref 80–100)
NRBC BLD-RTO: 0 /100 WBCS (ref 0–0)
PLATELET # BLD AUTO: 269 X10*3/UL (ref 150–450)
POTASSIUM SERPL-SCNC: 3.8 MMOL/L (ref 3.5–5.3)
PROT SERPL-MCNC: 6.6 G/DL (ref 6.4–8.2)
RBC # BLD AUTO: 4.44 X10*6/UL (ref 4.5–5.9)
SODIUM SERPL-SCNC: 135 MMOL/L (ref 136–145)
WBC # BLD AUTO: 10.8 X10*3/UL (ref 4.4–11.3)

## 2024-03-22 PROCEDURE — 99232 SBSQ HOSP IP/OBS MODERATE 35: CPT

## 2024-03-22 PROCEDURE — 2500000004 HC RX 250 GENERAL PHARMACY W/ HCPCS (ALT 636 FOR OP/ED): Performed by: HOSPITALIST

## 2024-03-22 PROCEDURE — C9113 INJ PANTOPRAZOLE SODIUM, VIA: HCPCS

## 2024-03-22 PROCEDURE — 36415 COLL VENOUS BLD VENIPUNCTURE: CPT

## 2024-03-22 PROCEDURE — 99233 SBSQ HOSP IP/OBS HIGH 50: CPT | Performed by: SURGERY

## 2024-03-22 PROCEDURE — 2500000004 HC RX 250 GENERAL PHARMACY W/ HCPCS (ALT 636 FOR OP/ED)

## 2024-03-22 PROCEDURE — 84075 ASSAY ALKALINE PHOSPHATASE: CPT

## 2024-03-22 PROCEDURE — 1100000001 HC PRIVATE ROOM DAILY

## 2024-03-22 PROCEDURE — 85027 COMPLETE CBC AUTOMATED: CPT

## 2024-03-22 RX ORDER — HYDRALAZINE HYDROCHLORIDE 20 MG/ML
10 INJECTION INTRAMUSCULAR; INTRAVENOUS EVERY 6 HOURS PRN
Status: DISCONTINUED | OUTPATIENT
Start: 2024-03-22 | End: 2024-03-26 | Stop reason: HOSPADM

## 2024-03-22 RX ORDER — PANTOPRAZOLE SODIUM 40 MG/10ML
INJECTION, POWDER, LYOPHILIZED, FOR SOLUTION INTRAVENOUS
Status: COMPLETED
Start: 2024-03-22 | End: 2024-03-22

## 2024-03-22 RX ORDER — SODIUM CHLORIDE 9 MG/ML
INJECTION, SOLUTION INTRAMUSCULAR; INTRAVENOUS; SUBCUTANEOUS
Status: DISPENSED
Start: 2024-03-22 | End: 2024-03-22

## 2024-03-22 RX ADMIN — SODIUM CHLORIDE 125 ML/HR: 9 INJECTION, SOLUTION INTRAVENOUS at 03:52

## 2024-03-22 RX ADMIN — ENOXAPARIN SODIUM 40 MG: 40 INJECTION SUBCUTANEOUS at 11:51

## 2024-03-22 RX ADMIN — PIPERACILLIN SODIUM AND TAZOBACTAM SODIUM 3.38 G: 3; .375 INJECTION, SOLUTION INTRAVENOUS at 22:07

## 2024-03-22 RX ADMIN — SODIUM CHLORIDE 125 ML/HR: 9 INJECTION, SOLUTION INTRAVENOUS at 22:07

## 2024-03-22 RX ADMIN — PANTOPRAZOLE SODIUM 40 MG: 40 INJECTION, POWDER, FOR SOLUTION INTRAVENOUS at 06:17

## 2024-03-22 RX ADMIN — PIPERACILLIN SODIUM AND TAZOBACTAM SODIUM 3.38 G: 3; .375 INJECTION, SOLUTION INTRAVENOUS at 16:09

## 2024-03-22 RX ADMIN — PIPERACILLIN SODIUM AND TAZOBACTAM SODIUM 3.38 G: 3; .375 INJECTION, SOLUTION INTRAVENOUS at 03:53

## 2024-03-22 RX ADMIN — SODIUM CHLORIDE 125 ML/HR: 9 INJECTION, SOLUTION INTRAVENOUS at 13:18

## 2024-03-22 RX ADMIN — HYDRALAZINE HYDROCHLORIDE 10 MG: 20 INJECTION INTRAMUSCULAR; INTRAVENOUS at 20:32

## 2024-03-22 RX ADMIN — PIPERACILLIN SODIUM AND TAZOBACTAM SODIUM 3.38 G: 3; .375 INJECTION, SOLUTION INTRAVENOUS at 09:57

## 2024-03-22 ASSESSMENT — COGNITIVE AND FUNCTIONAL STATUS - GENERAL
MOBILITY SCORE: 24
DAILY ACTIVITIY SCORE: 24
DAILY ACTIVITIY SCORE: 24
MOBILITY SCORE: 24

## 2024-03-22 ASSESSMENT — PAIN SCALES - GENERAL
PAINLEVEL_OUTOF10: 3
PAINLEVEL_OUTOF10: 0 - NO PAIN
PAINLEVEL_OUTOF10: 2

## 2024-03-22 ASSESSMENT — ACTIVITIES OF DAILY LIVING (ADL): LACK_OF_TRANSPORTATION: NO

## 2024-03-22 ASSESSMENT — PAIN - FUNCTIONAL ASSESSMENT
PAIN_FUNCTIONAL_ASSESSMENT: 0-10

## 2024-03-22 NOTE — CARE PLAN
Problem: Discharge Planning  Goal: Discharge to home or other facility with appropriate resources  Outcome: Progressing     Problem: Chronic Conditions and Co-morbidities  Goal: Patient's chronic conditions and co-morbidity symptoms are monitored and maintained or improved  Outcome: Progressing     Problem: Pain  Goal: Takes deep breaths with improved pain control throughout the shift  Outcome: Progressing  Goal: Turns in bed with improved pain control throughout the shift  Outcome: Progressing  Goal: Walks with improved pain control throughout the shift  Outcome: Progressing  Goal: Performs ADL's with improved pain control throughout shift  Outcome: Progressing  Goal: Participates in PT with improved pain control throughout the shift  Outcome: Progressing  Goal: Free from opioid side effects throughout the shift  Outcome: Progressing  Goal: Free from acute confusion related to pain meds throughout the shift  Outcome: Progressing   The patient's goals for the shift include      The clinical goals for the shift include to remain comfortable throughout the shift.    Over the shift, the patient did not make progress toward the following goals. Barriers to progression include . Recommendations to address these barriers include .

## 2024-03-22 NOTE — PROGRESS NOTES
Ramesh Amaya is a 38 y.o. male on day 1 of admission presenting with Perforation of sigmoid colon due to diverticulitis.      Subjective   Patient sitting up in chair on computer. Denies c/o's pain at this time. Verbalized he had some intermittent gas like pain earlier this AM that resolved when he had a moderate sized diarrhea bowel movement. Verbalizes his urine is clearing. Reviewed lab results and his urine was negative for UTI.       Objective     Last Recorded Vitals  /81 (BP Location: Left arm, Patient Position: Sitting)   Pulse 76   Temp 36.4 °C (97.5 °F) (Temporal)   Resp 18   Wt 90.7 kg (200 lb)   SpO2 96%   Intake/Output last 3 Shifts:    Intake/Output Summary (Last 24 hours) at 3/22/2024 0943  Last data filed at 3/21/2024 1808  Gross per 24 hour   Intake 3456.66 ml   Output --   Net 3456.66 ml       Admission Weight  Weight: 90.7 kg (200 lb) (03/21/24 0831)    Daily Weight  03/21/24 : 90.7 kg (200 lb)    Image Results  CT abdomen pelvis w IV contrast  Narrative: Interpreted By:  Yenny Blackman,   STUDY:  CT ABDOMEN PELVIS W IV CONTRAST;  3/21/2024 9:50 am      INDICATION:  Signs/Symptoms:LLQ abdominal pain.      COMPARISON:  None.      ACCESSION NUMBER(S):  [id number]      ORDERING CLINICIAN:  STEPHEN ESTRADA      TECHNIQUE:  CT of the abdomen and pelvis was performed. Sagittal and coronal  reconstructions were generated.    75 cc Omnipaque 350 intravenous  contrast given for the exam.      FINDINGS:          ABDOMINAL ORGANS:      LIVER: No focal lesion      GALL BLADDER AND BILIARY TREE: No calcified gallstone. No significant  biliary dilatation.      SPLEEN: No focal lesion      PANCREAS: No focal lesion      ADRENALS: No adrenal mass      KIDNEYS AND URETERS: No renal mass or hydronephrosis      BOWEL: The stomach is decompressed. Mild uneven small-bowel fluid  distention. Prominent submucosal fat in right lower quadrant/distal  ileal small bowel loops. Dots of air in nondilated  cecal appendix.  Marked low-density sigmoid colon wall thickening with ill-defined  dense probable inflamed diverticulum along its superolateral aspect  with adjacent loosely clustered extraluminal air bubbles and moderate  surrounding inflammatory changes. Adjacent fascial thickening  extending along the pelvic sidewall and inferior pericolic gutter.  Small amount of free fluid in the cul-de-sac. No discrete  extraluminal fluid collection.      PERITONEUM, RETROPERITONEUM, NODES: Again small amount of free fluid  in the cul-de-sac and inflammatory changes in the left lower  quadrant. Also mild thickening in the presacral space. No gross free  air. Subcentimeter lymph nodes in the retroperitoneum and mesentery.      VESSELS:  The abdominal aorta is normal in caliber. Scattered  atherosclerotic calcifications.      PELVIS: Urinary bladder is partially distended with diffusely  thickened wall. No gross prostate enlargement. Small prostate  calcifications.      ABDOMINAL WALL: Small fat containing umbilical and right inguinal  region hernias.      BONES: No definite focal concerning lytic or blastic osseous lesion.  Multilevel smooth vertebral endplate depression/Schmorl's nodes and  mild L5-S1 intervertebral disc space narrowing.      LOWER CHEST: No airspace consolidation or pleural effusion in the  included areas.      Impression: Findings consistent with severe acute diverticulitis of the sigmoid  colon with microperforation, small amount of free fluid and  inflammatory stranding extending along the left pelvic sidewall and  pericolic gutter.      Diffuse bladder wall thickening may be related to poor distention,  inflammation secondary to adjacent presumed diverticulitis and/or  primary cystitis. Clinical correlation suggested.      Additional findings as described above.      MACRO:  None.      Signed by: Yenny Blackman 3/21/2024 10:06 AM  Dictation workstation:   [id number]      Physical Exam  General  Appearance: AAO x 3, not in acute distress  Skin: skin color pink, warm, and dry; no suspicious rashes or lesions  Eyes : PERRL, EOM's intact  ENT: mucous membranes pink and moist  Neck: normocephalic  Respiratory: lungs clear to auscultation anteriorly; no wheezing, rhonchi, or crackles.   Heart: regular rate and rhythm. telemetry shows sinus rhythm  Abdomen: distended but pliable, positive bowel sounds x4, soft,  decrease in tenderness with palpation to lower abdomen and LLQ  Extremities: no edema   Peripheral pulses: normal x4 extremities  Neuro: alert, coherent and conversant, no focal motor deficits    Relevant Results  Results for orders placed or performed during the hospital encounter of 03/21/24 (from the past 24 hour(s))   Comprehensive metabolic panel   Result Value Ref Range    Glucose 84 74 - 99 mg/dL    Sodium 135 (L) 136 - 145 mmol/L    Potassium 3.8 3.5 - 5.3 mmol/L    Chloride 104 98 - 107 mmol/L    Bicarbonate 22 21 - 32 mmol/L    Anion Gap 13 10 - 20 mmol/L    Urea Nitrogen 15 6 - 23 mg/dL    Creatinine 0.80 0.50 - 1.30 mg/dL    eGFR >90 >60 mL/min/1.73m*2    Calcium 8.1 (L) 8.6 - 10.3 mg/dL    Albumin 3.3 (L) 3.4 - 5.0 g/dL    Alkaline Phosphatase 57 33 - 120 U/L    Total Protein 6.6 6.4 - 8.2 g/dL    AST 25 9 - 39 U/L    Bilirubin, Total 0.8 0.0 - 1.2 mg/dL    ALT 25 10 - 52 U/L   CBC   Result Value Ref Range    WBC 10.8 4.4 - 11.3 x10*3/uL    nRBC 0.0 0.0 - 0.0 /100 WBCs    RBC 4.44 (L) 4.50 - 5.90 x10*6/uL    Hemoglobin 14.5 13.5 - 17.5 g/dL    Hematocrit 40.9 (L) 41.0 - 52.0 %    MCV 92 80 - 100 fL    MCH 32.7 26.0 - 34.0 pg    MCHC 35.5 32.0 - 36.0 g/dL    RDW 11.3 (L) 11.5 - 14.5 %    Platelets 269 150 - 450 x10*3/uL     Scheduled medications  enoxaparin, 40 mg, subcutaneous, q24h  pantoprazole, 40 mg, intravenous, Daily before breakfast  piperacillin-tazobactam, 3.375 g, intravenous, q6h  sodium chloride (PF) 0.9%, , ,       Continuous medications  sodium chloride 0.9%, 125 mL/hr, Last  Rate: 125 mL/hr (03/22/24 9786)      PRN medications  PRN medications: acetaminophen, ketorolac, morphine, ondansetron ODT **OR** ondansetron, sodium chloride (PF) 0.9%    Assessment/Plan      Principal Problem:    Perforation of sigmoid colon due to diverticulitis    Ramesh Amaya is a 38 y.o. male with past medical history of asthma and discoid lupus presented to [facility] ED with complaints of abdominal pain. UA collected and negative for UTI but showed protein and ketones.  CT abdomen and pelvis with IV contrast performed showed severe acute diverticulitis of the sigmoid colon with microperforation, small amount of free fluid and inflammatory stranding extending to the left pelvic sidewall.  Emergency room spoke with Dr. Han recommendation was made to keep patient n.p.o. and continue Zosyn IV.  Patient states he has not seen a physician in at least 5 years. Patient admitted to telemetry with a working diagnosis of diverticulitis with microperforation     Plan:  Diverticulitis with microperforation of sigmoid colon  -Dr Han consulted- appreciate recommendations  -continue NPO. Small sips of water or ice chips for comfort per Dr. Han's note  -continue zosyn day 2  -continue telemetry  -continue IV fluids  -consulted dietitian for education. Significant other and patient asking for diet recommendations after discharge  -patient has decrease in pain. Using toradol as needed  -continue to monitor labs. Will check magnesium in the AM  -continue protonix     Leukocytosis  -has resolved  -continue zosyn    ETOH abuse  -monitor for withdrawal. Will hold off on CIWA at this time. Drinks 6 beers daily. Verbalizes large amount of stress     Discharge disposition:  anticipate discharge home when clinically stable    Yeimy Coker, STEPHANIE-CNP

## 2024-03-22 NOTE — CONSULTS
"Nutrition Initial Assessment:   Nutrition Assessment    Reason for Assessment: Provider consult order    Patient is a 38 y.o. male presenting with diverticulitis, microperforation    3/22/24 patient seen for diet education Low fiber and high fiber - see below      Nutrition History:  Energy Intake:  (NPO)  Food and Nutrient History: Regular diet prior to admission - abdominal pain x 5 days  Food Allergies/Intolerances:  None  GI Symptoms: Abdominal pain  Oral Problems: None       Anthropometrics:  Height: 170.2 cm (5' 7\")   Weight: 90.7 kg (200 lb)   BMI (Calculated): 31.32  IBW/kg (Dietitian Calculated): 67.2 kg  Percent of IBW: 135 %  Adjusted Body Weight (kg): 73 kg    Nutrition Focused Physical Exam Findings:  defer: not indicated      Nutrition Significant Labs:  CBC Trend:   Results from last 7 days   Lab Units 03/22/24  0455 03/21/24  0852   WBC AUTO x10*3/uL 10.8 15.7*   RBC AUTO x10*6/uL 4.44* 4.95   HEMOGLOBIN g/dL 14.5 16.2   HEMATOCRIT % 40.9* 44.9   MCV fL 92 91   PLATELETS AUTO x10*3/uL 269 269    , Renal Lab Trend:   Results from last 7 days   Lab Units 03/22/24  0455 03/21/24  0852   POTASSIUM mmol/L 3.8 3.3*   SODIUM mmol/L 135* 132*   EGFR mL/min/1.73m*2 >90 >90   BUN mg/dL 15 11   CREATININE mg/dL 0.80 0.72        I/O:   Last BM Date: 03/20/24;      Dietary Orders (From admission, onward)       Start     Ordered    03/21/24 1222  NPO Diet; Effective now  Diet effective now         03/21/24 1226                     Estimated Needs:   Total Energy Estimated Needs (kCal): 1995 kCal  Method for Estimating Needs: 20-22 kcal/kg = 0140-1929 kcal  Total Protein Estimated Needs (g): 91 g  Method for Estimating Needs: 0.8-1 gm/kg = 73-91 gm  Total Fluid Estimated Needs (mL): 3600 mL           Nutrition Diagnosis   Malnutrition Diagnosis  Patient has Malnutrition Diagnosis: No    Nutrition Diagnosis  Patient has Nutrition Diagnosis: Yes  Diagnosis Status (1): New  Nutrition Diagnosis 1: Altered GI " function  Related to (1): diverticulitis with microperforation  As Evidenced by (1): imaging, abdominal pain  Additional Nutrition Diagnosis: Diagnosis 2  Diagnosis Status (2): New  Nutrition Diagnosis 2: Food and nutrition related knowledge deficit  Related to (2): diverticulitis  As Evidenced by (2): newly diagnosed       Nutrition Interventions/Recommendations         Nutrition Prescription:  Individualized Nutrition Prescription Provided for : Oral nutrition        Nutrition Interventions:   Interventions: Meals and snacks  Meals and Snacks: Fiber-modified diet  Goal: Recommend low fiber diet x 2 weeks when advanced    Collaboration and Referral of Nutrition Care: Collaboration by nutrition professional with other providers, Team meeting involving nutrition professional  Goal: MELISSA Blackman    Nutrition Education:   Low fiber diet x 2 weeks then increase fiber gradually to a high fiber diet.       Nutrition Monitoring and Evaluation   Food/Nutrient Related History Monitoring  Monitoring and Evaluation Plan: Energy intake  Energy Intake: Estimated energy intake  Criteria: Monitor NPO/clear diet status       Time Spent/Follow-up Reminder:   Time Spent (min): 30 minutes  Last Date of Nutrition Visit: 03/22/24  Nutrition Follow-Up Needed?: 5-7 days  Follow up Comment: NPO/clear    Iman Cristina RDN, LD

## 2024-03-22 NOTE — PROGRESS NOTES
"General Surgery Progress Note    Pain improving, but still intermittently present. Crampy, relieved by bowel movement. Had a decent sized loose bowel movement early this AM. Remains afebrile. No tachycardia.    /81 (BP Location: Left arm, Patient Position: Sitting)   Pulse 76   Temp 36.4 °C (97.5 °F) (Temporal)   Resp 18   Ht 1.702 m (5' 7\")   Wt 90.7 kg (200 lb)   SpO2 96%   BMI 31.32 kg/m²   NAD, laying supine in bed   No labored breathing, on room air   NSR  Abdomen soft, non-distended, minimally tender to relatively deep palpation in the LLQ and suprapubic region    Intake/Output Summary (Last 24 hours) at 3/22/2024 0719  Last data filed at 3/21/2024 1808  Gross per 24 hour   Intake 3456.66 ml   Output --   Net 3456.66 ml     WBC 10.8 (from 15.7)  Cr 0.8, K 3.8    Patient is a 38 y.o. male with diverticulitis with microperforation. Improving on antibiotics. Given degree of inflammation on scan and the fact that he is still somewhat tender on exam, recommend keeping him NPO again today. Could have sips of water or ice chips for comfort. Continue antibiotics. Encourage ambulation in hallways.    Marcella Han MD  03/22/24  7:23 AM            "

## 2024-03-22 NOTE — PROGRESS NOTES
Pt reviewed in care rounds this morning. Pt not medically ready for discharge. ADOD is 48 hours. SW met w/ Pt at bedside. SW explained the role of care transitions and completed initial assessment. No concerns. Pt fully independent w/ AMPAC of 24. Plan is for Pt to discharge to home, no need for additional supports or services. SW to follow.      03/22/24 1100   Discharge Planning   Living Arrangements Spouse/significant other;Children   Support Systems Spouse/significant other   Assistance Needed none   Type of Residence Private residence   Home or Post Acute Services None   Patient expects to be discharged to: home no needs   Does the patient need discharge transport arranged? No   Financial Resource Strain   How hard is it for you to pay for the very basics like food, housing, medical care, and heating? Not hard   Housing Stability   In the last 12 months, was there a time when you were not able to pay the mortgage or rent on time? N   In the last 12 months, how many places have you lived? 1   In the last 12 months, was there a time when you did not have a steady place to sleep or slept in a shelter (including now)? N   Transportation Needs   In the past 12 months, has lack of transportation kept you from medical appointments or from getting medications? no   In the past 12 months, has lack of transportation kept you from meetings, work, or from getting things needed for daily living? No

## 2024-03-22 NOTE — CARE PLAN
Problem: Pain - Adult  Goal: Verbalizes/displays adequate comfort level or baseline comfort level  Outcome: Met     Problem: Safety - Adult  Goal: Free from fall injury  Outcome: Met   The patient's goals for the shift include      The clinical goals for the shift include pain control

## 2024-03-23 LAB
ANION GAP SERPL CALC-SCNC: 16 MMOL/L (ref 10–20)
BUN SERPL-MCNC: 8 MG/DL (ref 6–23)
CALCIUM SERPL-MCNC: 8.5 MG/DL (ref 8.6–10.3)
CHLORIDE SERPL-SCNC: 103 MMOL/L (ref 98–107)
CO2 SERPL-SCNC: 20 MMOL/L (ref 21–32)
CREAT SERPL-MCNC: 0.69 MG/DL (ref 0.5–1.3)
EGFRCR SERPLBLD CKD-EPI 2021: >90 ML/MIN/1.73M*2
ERYTHROCYTE [DISTWIDTH] IN BLOOD BY AUTOMATED COUNT: 10.9 % (ref 11.5–14.5)
GLUCOSE SERPL-MCNC: 81 MG/DL (ref 74–99)
HCT VFR BLD AUTO: 43.6 % (ref 41–52)
HGB BLD-MCNC: 15.5 G/DL (ref 13.5–17.5)
MAGNESIUM SERPL-MCNC: 1.82 MG/DL (ref 1.6–2.4)
MCH RBC QN AUTO: 32.4 PG (ref 26–34)
MCHC RBC AUTO-ENTMCNC: 35.6 G/DL (ref 32–36)
MCV RBC AUTO: 91 FL (ref 80–100)
NRBC BLD-RTO: 0 /100 WBCS (ref 0–0)
PLATELET # BLD AUTO: 327 X10*3/UL (ref 150–450)
POTASSIUM SERPL-SCNC: 3.5 MMOL/L (ref 3.5–5.3)
RBC # BLD AUTO: 4.78 X10*6/UL (ref 4.5–5.9)
SODIUM SERPL-SCNC: 135 MMOL/L (ref 136–145)
WBC # BLD AUTO: 12.2 X10*3/UL (ref 4.4–11.3)

## 2024-03-23 PROCEDURE — 36415 COLL VENOUS BLD VENIPUNCTURE: CPT

## 2024-03-23 PROCEDURE — C9113 INJ PANTOPRAZOLE SODIUM, VIA: HCPCS

## 2024-03-23 PROCEDURE — 2500000004 HC RX 250 GENERAL PHARMACY W/ HCPCS (ALT 636 FOR OP/ED)

## 2024-03-23 PROCEDURE — 2500000004 HC RX 250 GENERAL PHARMACY W/ HCPCS (ALT 636 FOR OP/ED): Performed by: HOSPITALIST

## 2024-03-23 PROCEDURE — 99233 SBSQ HOSP IP/OBS HIGH 50: CPT | Performed by: SURGERY

## 2024-03-23 PROCEDURE — 99232 SBSQ HOSP IP/OBS MODERATE 35: CPT

## 2024-03-23 PROCEDURE — 85027 COMPLETE CBC AUTOMATED: CPT

## 2024-03-23 PROCEDURE — 83735 ASSAY OF MAGNESIUM: CPT

## 2024-03-23 PROCEDURE — 84295 ASSAY OF SERUM SODIUM: CPT

## 2024-03-23 PROCEDURE — 1100000001 HC PRIVATE ROOM DAILY

## 2024-03-23 RX ORDER — SODIUM CHLORIDE 9 MG/ML
INJECTION, SOLUTION INTRAMUSCULAR; INTRAVENOUS; SUBCUTANEOUS
Status: DISPENSED
Start: 2024-03-23 | End: 2024-03-23

## 2024-03-23 RX ORDER — POTASSIUM CHLORIDE 14.9 MG/ML
20 INJECTION INTRAVENOUS ONCE
Status: COMPLETED | OUTPATIENT
Start: 2024-03-23 | End: 2024-03-23

## 2024-03-23 RX ORDER — DIAZEPAM 5 MG/ML
2 INJECTION, SOLUTION INTRAMUSCULAR; INTRAVENOUS EVERY 6 HOURS PRN
Status: DISCONTINUED | OUTPATIENT
Start: 2024-03-23 | End: 2024-03-26 | Stop reason: HOSPADM

## 2024-03-23 RX ORDER — PANTOPRAZOLE SODIUM 40 MG/10ML
INJECTION, POWDER, LYOPHILIZED, FOR SOLUTION INTRAVENOUS
Status: COMPLETED
Start: 2024-03-23 | End: 2024-03-23

## 2024-03-23 RX ADMIN — PANTOPRAZOLE SODIUM 40 MG: 40 INJECTION, POWDER, FOR SOLUTION INTRAVENOUS at 05:51

## 2024-03-23 RX ADMIN — SODIUM CHLORIDE 125 ML/HR: 9 INJECTION, SOLUTION INTRAVENOUS at 05:55

## 2024-03-23 RX ADMIN — PIPERACILLIN SODIUM AND TAZOBACTAM SODIUM 3.38 G: 3; .375 INJECTION, SOLUTION INTRAVENOUS at 09:44

## 2024-03-23 RX ADMIN — PIPERACILLIN SODIUM AND TAZOBACTAM SODIUM 3.38 G: 3; .375 INJECTION, SOLUTION INTRAVENOUS at 15:26

## 2024-03-23 RX ADMIN — HYDRALAZINE HYDROCHLORIDE 10 MG: 20 INJECTION INTRAMUSCULAR; INTRAVENOUS at 15:43

## 2024-03-23 RX ADMIN — DIAZEPAM 2 MG: 5 INJECTION, SOLUTION INTRAMUSCULAR; INTRAVENOUS at 23:05

## 2024-03-23 RX ADMIN — HYDRALAZINE HYDROCHLORIDE 10 MG: 20 INJECTION INTRAMUSCULAR; INTRAVENOUS at 21:57

## 2024-03-23 RX ADMIN — ENOXAPARIN SODIUM 40 MG: 40 INJECTION SUBCUTANEOUS at 12:14

## 2024-03-23 RX ADMIN — PIPERACILLIN SODIUM AND TAZOBACTAM SODIUM 3.38 G: 3; .375 INJECTION, SOLUTION INTRAVENOUS at 03:34

## 2024-03-23 RX ADMIN — PIPERACILLIN SODIUM AND TAZOBACTAM SODIUM 3.38 G: 3; .375 INJECTION, SOLUTION INTRAVENOUS at 21:57

## 2024-03-23 RX ADMIN — POTASSIUM CHLORIDE 20 MEQ: 14.9 INJECTION, SOLUTION INTRAVENOUS at 07:42

## 2024-03-23 ASSESSMENT — COGNITIVE AND FUNCTIONAL STATUS - GENERAL
CLIMB 3 TO 5 STEPS WITH RAILING: A LITTLE
MOBILITY SCORE: 23
DAILY ACTIVITIY SCORE: 24

## 2024-03-23 ASSESSMENT — PAIN SCALES - GENERAL
PAINLEVEL_OUTOF10: 0 - NO PAIN

## 2024-03-23 ASSESSMENT — PAIN - FUNCTIONAL ASSESSMENT
PAIN_FUNCTIONAL_ASSESSMENT: 0-10

## 2024-03-23 NOTE — PROGRESS NOTES
"Raemsh Amaya is a 38 y.o. male on day 2 of admission presenting with Perforation of sigmoid colon due to diverticulitis.      Subjective   Sitting up in bed watching TV.  Denies complaints of pain.  Verbalizes intermittent \"cramping \"like pain during the night and had multiple bowel movements.  Patient still describes bowel movements as \"running with small pieces of fecal matter.\"  Complains of anxiety and and asking for something to help.  Had a conversation with him regarding his current alcohol use and he believes he may use to help with stress management.       Objective     Last Recorded Vitals  /86   Pulse 83   Temp 36.3 °C (97.3 °F)   Resp 18   Wt 90.7 kg (200 lb)   SpO2 98%   Intake/Output last 3 Shifts:    Intake/Output Summary (Last 24 hours) at 3/23/2024 0906  Last data filed at 3/23/2024 0820  Gross per 24 hour   Intake 3868.75 ml   Output --   Net 3868.75 ml       Admission Weight  Weight: 90.7 kg (200 lb) (03/21/24 0831)    Daily Weight  03/21/24 : 90.7 kg (200 lb)    Image Results  CT abdomen pelvis w IV contrast  Narrative: Interpreted By:  Yenny Blackman,   STUDY:  CT ABDOMEN PELVIS W IV CONTRAST;  3/21/2024 9:50 am      INDICATION:  Signs/Symptoms:LLQ abdominal pain.      COMPARISON:  None.      ACCESSION NUMBER(S):  [id number]      ORDERING CLINICIAN:  STEPHEN ESTRADA      TECHNIQUE:  CT of the abdomen and pelvis was performed. Sagittal and coronal  reconstructions were generated.    75 cc Omnipaque 350 intravenous  contrast given for the exam.      FINDINGS:          ABDOMINAL ORGANS:      LIVER: No focal lesion      GALL BLADDER AND BILIARY TREE: No calcified gallstone. No significant  biliary dilatation.      SPLEEN: No focal lesion      PANCREAS: No focal lesion      ADRENALS: No adrenal mass      KIDNEYS AND URETERS: No renal mass or hydronephrosis      BOWEL: The stomach is decompressed. Mild uneven small-bowel fluid  distention. Prominent submucosal fat in right lower " quadrant/distal  ileal small bowel loops. Dots of air in nondilated cecal appendix.  Marked low-density sigmoid colon wall thickening with ill-defined  dense probable inflamed diverticulum along its superolateral aspect  with adjacent loosely clustered extraluminal air bubbles and moderate  surrounding inflammatory changes. Adjacent fascial thickening  extending along the pelvic sidewall and inferior pericolic gutter.  Small amount of free fluid in the cul-de-sac. No discrete  extraluminal fluid collection.      PERITONEUM, RETROPERITONEUM, NODES: Again small amount of free fluid  in the cul-de-sac and inflammatory changes in the left lower  quadrant. Also mild thickening in the presacral space. No gross free  air. Subcentimeter lymph nodes in the retroperitoneum and mesentery.      VESSELS:  The abdominal aorta is normal in caliber. Scattered  atherosclerotic calcifications.      PELVIS: Urinary bladder is partially distended with diffusely  thickened wall. No gross prostate enlargement. Small prostate  calcifications.      ABDOMINAL WALL: Small fat containing umbilical and right inguinal  region hernias.      BONES: No definite focal concerning lytic or blastic osseous lesion.  Multilevel smooth vertebral endplate depression/Schmorl's nodes and  mild L5-S1 intervertebral disc space narrowing.      LOWER CHEST: No airspace consolidation or pleural effusion in the  included areas.      Impression: Findings consistent with severe acute diverticulitis of the sigmoid  colon with microperforation, small amount of free fluid and  inflammatory stranding extending along the left pelvic sidewall and  pericolic gutter.      Diffuse bladder wall thickening may be related to poor distention,  inflammation secondary to adjacent presumed diverticulitis and/or  primary cystitis. Clinical correlation suggested.      Additional findings as described above.      MACRO:  None.      Signed by: Yenny Blackman 3/21/2024 10:06  AM  Dictation workstation:   [id number]      Physical Exam  General Appearance: AAO x 3, not in acute distress  Skin: skin color pink, warm, and dry; no suspicious rashes or lesions  Eyes : PERRL, EOM's intact  ENT: mucous membranes pink and moist  Neck: normocephalic  Respiratory: lungs clear to auscultation anteriorly; no wheezing, rhonchi, or crackles.   Heart: regular rate and rhythm.   Abdomen: distended but pliable, positive bowel sounds x4, soft,  denies tenderness with palpation to lower abdomen and LLQ  Extremities: no edema   Peripheral pulses: normal x4 extremities  Neuro: alert, coherent and conversant, no focal motor deficits    Relevant Results  Results for orders placed or performed during the hospital encounter of 03/21/24 (from the past 24 hour(s))   Basic Metabolic Panel   Result Value Ref Range    Glucose 81 74 - 99 mg/dL    Sodium 135 (L) 136 - 145 mmol/L    Potassium 3.5 3.5 - 5.3 mmol/L    Chloride 103 98 - 107 mmol/L    Bicarbonate 20 (L) 21 - 32 mmol/L    Anion Gap 16 10 - 20 mmol/L    Urea Nitrogen 8 6 - 23 mg/dL    Creatinine 0.69 0.50 - 1.30 mg/dL    eGFR >90 >60 mL/min/1.73m*2    Calcium 8.5 (L) 8.6 - 10.3 mg/dL   CBC   Result Value Ref Range    WBC 12.2 (H) 4.4 - 11.3 x10*3/uL    nRBC 0.0 0.0 - 0.0 /100 WBCs    RBC 4.78 4.50 - 5.90 x10*6/uL    Hemoglobin 15.5 13.5 - 17.5 g/dL    Hematocrit 43.6 41.0 - 52.0 %    MCV 91 80 - 100 fL    MCH 32.4 26.0 - 34.0 pg    MCHC 35.6 32.0 - 36.0 g/dL    RDW 10.9 (L) 11.5 - 14.5 %    Platelets 327 150 - 450 x10*3/uL   Magnesium   Result Value Ref Range    Magnesium 1.82 1.60 - 2.40 mg/dL     Scheduled medications  enoxaparin, 40 mg, subcutaneous, q24h  pantoprazole, 40 mg, intravenous, Daily before breakfast  piperacillin-tazobactam, 3.375 g, intravenous, q6h  potassium chloride, 20 mEq, intravenous, Once  sodium chloride (PF) 0.9%, , ,       Continuous medications  sodium chloride 0.9%, 125 mL/hr, Last Rate: 125 mL/hr (03/23/24 0555)      PRN  medications  PRN medications: acetaminophen, diazePAM, hydrALAZINE, ketorolac, morphine, ondansetron ODT **OR** ondansetron, sodium chloride (PF) 0.9%       Assessment/Plan      Principal Problem:    Perforation of sigmoid colon due to diverticulitis    Ramesh Amaya is a 38 y.o. male with past medical history of asthma and discoid lupus presented to [facility] ED with complaints of abdominal pain. UA collected and negative for UTI but showed protein and ketones.  CT abdomen and pelvis with IV contrast performed showed severe acute diverticulitis of the sigmoid colon with microperforation, small amount of free fluid and inflammatory stranding extending to the left pelvic sidewall.  Emergency room spoke with Dr. Han recommendation was made to keep patient n.p.o. and continue Zosyn IV.  Patient states he has not seen a physician in at least 5 years. Patient admitted to telemetry with a working diagnosis of diverticulitis with microperforation     Plan:  Diverticulitis with microperforation of sigmoid colon  -Dr Han consulted- appreciate recommendations  -continue NPO. Small sips of water or ice chips for comfort per Dr. Han's note  -continue zosyn day 3  -Telemetry  -continue IV fluids  -consulted dietitian for education. Significant other and patient asking for diet recommendations after discharge  -patient has decrease in pain. Using toradol as needed  -continue to monitor labs.  Magnesium within normal limits  -continue protonix  -Patient walking in room and encouraged him to walk in hallway     Leukocytosis  -Slight increase in white blood cells today.  Patient verbalizes decrease in pain and abdominal tenderness.  He is afebrile  -continue zosyn     ETOH abuse  -monitor for withdrawal. Will hold off on CIWA at this time. Drinks 6 beers daily. Verbalizes large amount of stress  -Patient asking for something for anxiety.  Time spent in conversation reviewing alcohol use and alternative ways to help with  stress management.  Diazepam ordered as needed for anxiety.  Patient would benefit from anxiety medication at discharge.  He is interested in decreasing alcohol intake from daily use to occasional use on weekends     Discharge disposition:  anticipate discharge home when clinically stable    STEPHANIE Woodall-CNP

## 2024-03-23 NOTE — PROGRESS NOTES
"General Surgery Progress Note    Had some recurrence of LLQ pain last night. Passing multiple small volume loose bowel movements. No blood in the stool. No measured fevers, but subjectively patient reports that he felt warm and then some chills last night. No appetite, remains NPO with ice chips for comfort. WBC mildly up today, 12.2 from 10.8 yesterday.     /86   Pulse 83   Temp 36.3 °C (97.3 °F)   Resp 18   Ht 1.702 m (5' 7\")   Wt 90.7 kg (200 lb)   SpO2 98%   BMI 31.32 kg/m²   NAD, sitting semi-upright in bed, girlfriend at bedside  No labored breathing, on room air   NSR  Abdomen soft, non-distended, non-tender this AM    Intake/Output Summary (Last 24 hours) at 3/23/2024 1051  Last data filed at 3/23/2024 1014  Gross per 24 hour   Intake 3918.75 ml   Output --   Net 3918.75 ml     WBC 12.2, from 10.8 yesterday  K 3.5    Patient is a 38 y.o. male with diverticulitis with microperforation. WBC mildly up and some recurrence of the LLQ pain last night. Will therefore continue NPO, especially since he doesn't have much of an appetite. May be developing an abscess. Will repeat labs tomorrow AM. If WBC continues to increase, would plan to repeat CT scan Monday morning. Recommend continued ambulation in hallways.    Marcella Han MD  03/23/24  10:54 AM            "

## 2024-03-23 NOTE — CARE PLAN
Problem: Pain  Goal: Takes deep breaths with improved pain control throughout the shift  Outcome: Met  Goal: Turns in bed with improved pain control throughout the shift  Outcome: Met   The patient's goals for the shift include use the call light    The clinical goals for the shift include pain control

## 2024-03-23 NOTE — CARE PLAN
The patient's goals for the shift include use the call light    The clinical goals for the shift include to remain comfortable  Problem: Discharge Planning  Goal: Discharge to home or other facility with appropriate resources  Outcome: Progressing     Problem: Discharge Planning  Goal: Discharge to home or other fac  Problem: Chronic Conditions and Co-morbidities  Goal: Patient's chronic conditions and co-morbidity symptoms are monitored and maintained or improved  Outcome: Progressing   ility with appropriate resources  Outcome: Progressing     Problem: Discharge Planning  Goal: Discharge to home or other facility with appropriate resources  Outcome: Progressing     Problem: Pain  Goal: Walks with improved pain control throughout the shift  Outcome: Progressing     Problem: Pain  Goal: Performs ADL's with improved pain control throughout shift  Outcome: Progressing     Problem: Pain  Goal: Participates in PT with improved pain control throughout the shift  Outcome: Progressing     Problem: Pain  Goal: Free from opioid side effects throughout the shift  Outcome: Progressing     Problem: Pain  Goal: Free from acute confusion related to pain meds throughout the shift  Outcome: Progressing     Problem: Nutrition  Goal: Lab values WNL  Outcome: Progressing     Problem: Nutrition  Goal: Less than 5 days NPO/clear liquids  Outcome: Progressing     Problem: Nutrition  Goal: Electrolytes WNL  Outcome: Progressing     Problem: Nutrition  Goal: Maintain stable weight  Outcome: Progressing

## 2024-03-23 NOTE — PROGRESS NOTES
Pt reviewed during Care Rounds today and he is not ready for discharge. Pt continues to be NPO and on IV Fluids- weekend discharge is not expected. The plan on admission was to return home where he lives independently with his family. Care Transitions will follow as needed.    CHAPARRITA De León

## 2024-03-24 LAB
ANION GAP SERPL CALC-SCNC: 17 MMOL/L (ref 10–20)
BUN SERPL-MCNC: 7 MG/DL (ref 6–23)
CALCIUM SERPL-MCNC: 8.6 MG/DL (ref 8.6–10.3)
CHLORIDE SERPL-SCNC: 103 MMOL/L (ref 98–107)
CO2 SERPL-SCNC: 17 MMOL/L (ref 21–32)
CREAT SERPL-MCNC: 0.65 MG/DL (ref 0.5–1.3)
EGFRCR SERPLBLD CKD-EPI 2021: >90 ML/MIN/1.73M*2
ERYTHROCYTE [DISTWIDTH] IN BLOOD BY AUTOMATED COUNT: 11.2 % (ref 11.5–14.5)
GLUCOSE SERPL-MCNC: 78 MG/DL (ref 74–99)
HCT VFR BLD AUTO: 42.4 % (ref 41–52)
HGB BLD-MCNC: 15.4 G/DL (ref 13.5–17.5)
MCH RBC QN AUTO: 32.8 PG (ref 26–34)
MCHC RBC AUTO-ENTMCNC: 36.3 G/DL (ref 32–36)
MCV RBC AUTO: 90 FL (ref 80–100)
NRBC BLD-RTO: 0 /100 WBCS (ref 0–0)
PLATELET # BLD AUTO: 382 X10*3/UL (ref 150–450)
POTASSIUM SERPL-SCNC: 3.5 MMOL/L (ref 3.5–5.3)
RBC # BLD AUTO: 4.69 X10*6/UL (ref 4.5–5.9)
SODIUM SERPL-SCNC: 133 MMOL/L (ref 136–145)
WBC # BLD AUTO: 13.4 X10*3/UL (ref 4.4–11.3)

## 2024-03-24 PROCEDURE — 2500000004 HC RX 250 GENERAL PHARMACY W/ HCPCS (ALT 636 FOR OP/ED)

## 2024-03-24 PROCEDURE — 36415 COLL VENOUS BLD VENIPUNCTURE: CPT

## 2024-03-24 PROCEDURE — 85027 COMPLETE CBC AUTOMATED: CPT

## 2024-03-24 PROCEDURE — C9113 INJ PANTOPRAZOLE SODIUM, VIA: HCPCS

## 2024-03-24 PROCEDURE — 1100000001 HC PRIVATE ROOM DAILY

## 2024-03-24 PROCEDURE — 99233 SBSQ HOSP IP/OBS HIGH 50: CPT | Performed by: SURGERY

## 2024-03-24 PROCEDURE — 80048 BASIC METABOLIC PNL TOTAL CA: CPT

## 2024-03-24 PROCEDURE — 2500000004 HC RX 250 GENERAL PHARMACY W/ HCPCS (ALT 636 FOR OP/ED): Performed by: HOSPITALIST

## 2024-03-24 PROCEDURE — 99232 SBSQ HOSP IP/OBS MODERATE 35: CPT

## 2024-03-24 RX ORDER — POTASSIUM CHLORIDE 14.9 MG/ML
20 INJECTION INTRAVENOUS ONCE
Status: COMPLETED | OUTPATIENT
Start: 2024-03-24 | End: 2024-03-24

## 2024-03-24 RX ADMIN — PIPERACILLIN SODIUM AND TAZOBACTAM SODIUM 3.38 G: 3; .375 INJECTION, SOLUTION INTRAVENOUS at 04:39

## 2024-03-24 RX ADMIN — PIPERACILLIN SODIUM AND TAZOBACTAM SODIUM 3.38 G: 3; .375 INJECTION, SOLUTION INTRAVENOUS at 17:08

## 2024-03-24 RX ADMIN — HYDRALAZINE HYDROCHLORIDE 10 MG: 20 INJECTION INTRAMUSCULAR; INTRAVENOUS at 17:18

## 2024-03-24 RX ADMIN — DIAZEPAM 2 MG: 5 INJECTION, SOLUTION INTRAMUSCULAR; INTRAVENOUS at 21:51

## 2024-03-24 RX ADMIN — ENOXAPARIN SODIUM 40 MG: 40 INJECTION SUBCUTANEOUS at 11:55

## 2024-03-24 RX ADMIN — PIPERACILLIN SODIUM AND TAZOBACTAM SODIUM 3.38 G: 3; .375 INJECTION, SOLUTION INTRAVENOUS at 21:51

## 2024-03-24 RX ADMIN — PIPERACILLIN SODIUM AND TAZOBACTAM SODIUM 3.38 G: 3; .375 INJECTION, SOLUTION INTRAVENOUS at 11:04

## 2024-03-24 RX ADMIN — DIAZEPAM 2 MG: 5 INJECTION, SOLUTION INTRAMUSCULAR; INTRAVENOUS at 14:44

## 2024-03-24 RX ADMIN — HYDRALAZINE HYDROCHLORIDE 10 MG: 20 INJECTION INTRAMUSCULAR; INTRAVENOUS at 08:05

## 2024-03-24 RX ADMIN — PANTOPRAZOLE SODIUM 40 MG: 40 INJECTION, POWDER, FOR SOLUTION INTRAVENOUS at 08:04

## 2024-03-24 RX ADMIN — SODIUM CHLORIDE 125 ML/HR: 9 INJECTION, SOLUTION INTRAVENOUS at 00:04

## 2024-03-24 RX ADMIN — POTASSIUM CHLORIDE 20 MEQ: 14.9 INJECTION, SOLUTION INTRAVENOUS at 07:58

## 2024-03-24 RX ADMIN — SODIUM CHLORIDE 125 ML/HR: 9 INJECTION, SOLUTION INTRAVENOUS at 17:04

## 2024-03-24 RX ADMIN — SODIUM CHLORIDE 125 ML/HR: 9 INJECTION, SOLUTION INTRAVENOUS at 07:57

## 2024-03-24 ASSESSMENT — PAIN - FUNCTIONAL ASSESSMENT
PAIN_FUNCTIONAL_ASSESSMENT: 0-10

## 2024-03-24 ASSESSMENT — PAIN SCALES - GENERAL
PAINLEVEL_OUTOF10: 0 - NO PAIN

## 2024-03-24 NOTE — PROGRESS NOTES
"General Surgery Progress Note    Anxious today. Pain improved. Continues to pass bowel movements. Afebrile, but WBC up a little again today. Starting to have return of appetite. Hypertensive, patient reports that this is secondary to stress.    BP (!) 199/109   Pulse 106   Temp 36.2 °C (97.2 °F) (Temporal)   Resp 18   Ht 1.702 m (5' 7\")   Wt 90.7 kg (200 lb)   SpO2 98%   BMI 31.32 kg/m²   NAD, sitting semi-upright in bed with girlfriend at bedside  No labored breathing, on room air   NSR  Abdomen soft, non-distended, non-tender    Intake/Output Summary (Last 24 hours) at 3/24/2024 1033  Last data filed at 3/24/2024 0958  Gross per 24 hour   Intake 825 ml   Output 3 ml   Net 822 ml     WBC 13.4 (from 12.2. from 10.8)    Patient is a 38 y.o. male with diverticulitis with microperforation. WBC mildly up again. May be developing an abscess. If WBC continues to increase, will get repeat CT scan tomorrow morning. Recommend continued ambulation in hallways. Since appetite is returning and pain improving, could trial clear liquids, although would encourage just sipping on protein shakes (as no nutritional value in jello/soup). If tolerates protein shake without pain, could have a tray of clears this evening.    Marcella Han MD  03/24/24  10:38 AM            "

## 2024-03-24 NOTE — PROGRESS NOTES
Ramesh Amaya is a 38 y.o. male on day 3 of admission presenting with Perforation of sigmoid colon due to diverticulitis.      Subjective   Patient sitting up in bed with significant other at bedside.  States he feels clinically better and denies complaints of pain.  States he had small liquid type bowel movements with pieces of stool.  Much time spent in conversation providing reassurance and answering questions.  Verbalizing concerns that he is feeling hungry and beginning to question why he is still in the hospital.  He is adamant about not needing a nicotine patch and hesitant to take medication for anxiety and increased blood pressure.  Reviewed with him concerns regarding his use of tobacco and alcohol for stress management at home and his hesitation and receiving treatment for anxiety while at the hospital       Objective     Last Recorded Vitals  BP (!) 199/109   Pulse 106   Temp 36.2 °C (97.2 °F) (Temporal)   Resp 18   Wt 90.7 kg (200 lb)   SpO2 98%   Intake/Output last 3 Shifts:    Intake/Output Summary (Last 24 hours) at 3/24/2024 1159  Last data filed at 3/24/2024 1134  Gross per 24 hour   Intake 925 ml   Output 3 ml   Net 922 ml       Admission Weight  Weight: 90.7 kg (200 lb) (03/21/24 0831)    Daily Weight  03/21/24 : 90.7 kg (200 lb)    Image Results  CT abdomen pelvis w IV contrast  Narrative: Interpreted By:  Yenny Blackman,   STUDY:  CT ABDOMEN PELVIS W IV CONTRAST;  3/21/2024 9:50 am      INDICATION:  Signs/Symptoms:LLQ abdominal pain.      COMPARISON:  None.      ACCESSION NUMBER(S):  [id number]      ORDERING CLINICIAN:  STEPHEN ESTRADA      TECHNIQUE:  CT of the abdomen and pelvis was performed. Sagittal and coronal  reconstructions were generated.    75 cc Omnipaque 350 intravenous  contrast given for the exam.      FINDINGS:          ABDOMINAL ORGANS:      LIVER: No focal lesion      GALL BLADDER AND BILIARY TREE: No calcified gallstone. No significant  biliary dilatation.       SPLEEN: No focal lesion      PANCREAS: No focal lesion      ADRENALS: No adrenal mass      KIDNEYS AND URETERS: No renal mass or hydronephrosis      BOWEL: The stomach is decompressed. Mild uneven small-bowel fluid  distention. Prominent submucosal fat in right lower quadrant/distal  ileal small bowel loops. Dots of air in nondilated cecal appendix.  Marked low-density sigmoid colon wall thickening with ill-defined  dense probable inflamed diverticulum along its superolateral aspect  with adjacent loosely clustered extraluminal air bubbles and moderate  surrounding inflammatory changes. Adjacent fascial thickening  extending along the pelvic sidewall and inferior pericolic gutter.  Small amount of free fluid in the cul-de-sac. No discrete  extraluminal fluid collection.      PERITONEUM, RETROPERITONEUM, NODES: Again small amount of free fluid  in the cul-de-sac and inflammatory changes in the left lower  quadrant. Also mild thickening in the presacral space. No gross free  air. Subcentimeter lymph nodes in the retroperitoneum and mesentery.      VESSELS:  The abdominal aorta is normal in caliber. Scattered  atherosclerotic calcifications.      PELVIS: Urinary bladder is partially distended with diffusely  thickened wall. No gross prostate enlargement. Small prostate  calcifications.      ABDOMINAL WALL: Small fat containing umbilical and right inguinal  region hernias.      BONES: No definite focal concerning lytic or blastic osseous lesion.  Multilevel smooth vertebral endplate depression/Schmorl's nodes and  mild L5-S1 intervertebral disc space narrowing.      LOWER CHEST: No airspace consolidation or pleural effusion in the  included areas.      Impression: Findings consistent with severe acute diverticulitis of the sigmoid  colon with microperforation, small amount of free fluid and  inflammatory stranding extending along the left pelvic sidewall and  pericolic gutter.      Diffuse bladder wall thickening may  be related to poor distention,  inflammation secondary to adjacent presumed diverticulitis and/or  primary cystitis. Clinical correlation suggested.      Additional findings as described above.      MACRO:  None.      Signed by: Yenny Blackman 3/21/2024 10:06 AM  Dictation workstation:   [id number]      Physical Exam  General Appearance: AAO x 3, not in acute distress, anxious  Skin: skin color pink, warm, and dry; no suspicious rashes or lesions  Eyes : PERRL, EOM's intact  ENT: mucous membranes pink and moist  Neck: normocephalic  Respiratory: lungs clear to auscultation anteriorly; no wheezing, rhonchi, or crackles.   Heart: regular rate and rhythm. Telemetry shows sinus rhythm  Abdomen: nondistended , positive bowel sounds x4, soft,  denies tenderness with palpation to lower abdomen and LLQ  Extremities: no edema   Peripheral pulses: normal x4 extremities  Neuro: alert, coherent and conversant, no focal motor deficits    Relevant Results  Results for orders placed or performed during the hospital encounter of 03/21/24 (from the past 24 hour(s))   CBC   Result Value Ref Range    WBC 13.4 (H) 4.4 - 11.3 x10*3/uL    nRBC 0.0 0.0 - 0.0 /100 WBCs    RBC 4.69 4.50 - 5.90 x10*6/uL    Hemoglobin 15.4 13.5 - 17.5 g/dL    Hematocrit 42.4 41.0 - 52.0 %    MCV 90 80 - 100 fL    MCH 32.8 26.0 - 34.0 pg    MCHC 36.3 (H) 32.0 - 36.0 g/dL    RDW 11.2 (L) 11.5 - 14.5 %    Platelets 382 150 - 450 x10*3/uL   Basic Metabolic Panel   Result Value Ref Range    Glucose 78 74 - 99 mg/dL    Sodium 133 (L) 136 - 145 mmol/L    Potassium 3.5 3.5 - 5.3 mmol/L    Chloride 103 98 - 107 mmol/L    Bicarbonate 17 (L) 21 - 32 mmol/L    Anion Gap 17 10 - 20 mmol/L    Urea Nitrogen 7 6 - 23 mg/dL    Creatinine 0.65 0.50 - 1.30 mg/dL    eGFR >90 >60 mL/min/1.73m*2    Calcium 8.6 8.6 - 10.3 mg/dL     Scheduled medications  enoxaparin, 40 mg, subcutaneous, q24h  pantoprazole, 40 mg, intravenous, Daily before breakfast  piperacillin-tazobactam, 3.375  g, intravenous, q6h      Continuous medications  sodium chloride 0.9%, 125 mL/hr, Last Rate: 125 mL/hr (03/24/24 2565)      PRN medications  PRN medications: acetaminophen, diazePAM, hydrALAZINE, ketorolac, morphine, ondansetron ODT **OR** ondansetron      Assessment/Plan      Principal Problem:    Perforation of sigmoid colon due to diverticulitis    Ramesh Amaya is a 38 y.o. male with past medical history of asthma and discoid lupus presented to [facility] ED with complaints of abdominal pain. UA collected and negative for UTI but showed protein and ketones.  CT abdomen and pelvis with IV contrast performed showed severe acute diverticulitis of the sigmoid colon with microperforation, small amount of free fluid and inflammatory stranding extending to the left pelvic sidewall.  Emergency room spoke with Dr. Han recommendation was made to keep patient n.p.o. and continue Zosyn IV.  Patient states he has not seen a physician in at least 5 years. Patient admitted to telemetry with a working diagnosis of diverticulitis with microperforation     Plan:  Diverticulitis with microperforation of sigmoid colon  -Dr Han consulted- appreciate recommendations  -Patient to have clear protein shake and if tolerates can have clear liquids for supper.  Dr. Sippy recommended n.p.o. after midnight for possible CT  -continue zosyn day 4  -DC telemetry  -continue IV fluids  -consulted dietitian for education. Significant other and patient asking for diet recommendations after discharge  -patient has decrease in pain. Using toradol as needed  -continue to monitor labs.  Magnesium within normal limits  -continue protonix  -Patient walking in room and encouraged him to walk in hallway  -Patient verbalizes concern that his stress level is causing inflammation and feels that if his stress is better controlled he will be able to be discharged home     Leukocytosis  -Continued increase in white blood cells today.  Patient verbalizes  decrease in pain and abdominal tenderness.  He is afebrile  -continue zosyn  -If WBCs continue to increase, surgery recommending possible need for repeat CT abdomen and pelvis to evaluate possible abscess     ETOH abuse  -monitor for withdrawal. Will hold off on CIWA at this time. Drinks 6 beers daily. Verbalizes large amount of stress  -Patient verbalized that he has not slept well since hospitalization but is refusing additional medication..  Time spent in conversation reviewing alcohol use and alternative ways to help with stress management.  Would benefit from medication at discharge but is hesitant  -Patient would benefit from outpatient counseling to assist with stress management but not sure that he would be agreeable    Hypertension  -Continue hydralazine as needed  -Patient refuses NicoDerm patch for smoking use    Discharge disposition:  anticipate discharge home when clinically stable    STEPHANIE Woodall-CNP

## 2024-03-24 NOTE — CARE PLAN
The patient's goals for the shift include use the call light    The clinical goals for the shift include to remain comfortable      Problem: Pain  Goal: Performs ADL's with improved pain control throughout shift  Outcome: Progressing     Problem: Pain  Goal: Walks with improved pain control throughout the shift  Outcome: Progressing     Problem: Pain  Goal: Free from opioid side effects throughout the shift  Outcome: Progressing

## 2024-03-25 ENCOUNTER — APPOINTMENT (OUTPATIENT)
Dept: RADIOLOGY | Facility: HOSPITAL | Age: 39
DRG: 391 | End: 2024-03-25
Payer: COMMERCIAL

## 2024-03-25 LAB
ANION GAP SERPL CALC-SCNC: 13 MMOL/L (ref 10–20)
BUN SERPL-MCNC: 6 MG/DL (ref 6–23)
CALCIUM SERPL-MCNC: 8.5 MG/DL (ref 8.6–10.3)
CHLORIDE SERPL-SCNC: 105 MMOL/L (ref 98–107)
CO2 SERPL-SCNC: 20 MMOL/L (ref 21–32)
CREAT SERPL-MCNC: 0.63 MG/DL (ref 0.5–1.3)
EGFRCR SERPLBLD CKD-EPI 2021: >90 ML/MIN/1.73M*2
ERYTHROCYTE [DISTWIDTH] IN BLOOD BY AUTOMATED COUNT: 11.1 % (ref 11.5–14.5)
GLUCOSE SERPL-MCNC: 87 MG/DL (ref 74–99)
HCT VFR BLD AUTO: 41.2 % (ref 41–52)
HGB BLD-MCNC: 14.8 G/DL (ref 13.5–17.5)
MCH RBC QN AUTO: 32.3 PG (ref 26–34)
MCHC RBC AUTO-ENTMCNC: 35.9 G/DL (ref 32–36)
MCV RBC AUTO: 90 FL (ref 80–100)
NRBC BLD-RTO: 0 /100 WBCS (ref 0–0)
PLATELET # BLD AUTO: 404 X10*3/UL (ref 150–450)
POTASSIUM SERPL-SCNC: 3.4 MMOL/L (ref 3.5–5.3)
RBC # BLD AUTO: 4.58 X10*6/UL (ref 4.5–5.9)
SODIUM SERPL-SCNC: 135 MMOL/L (ref 136–145)
WBC # BLD AUTO: 11.9 X10*3/UL (ref 4.4–11.3)

## 2024-03-25 PROCEDURE — 1100000001 HC PRIVATE ROOM DAILY

## 2024-03-25 PROCEDURE — 82374 ASSAY BLOOD CARBON DIOXIDE: CPT

## 2024-03-25 PROCEDURE — 49405 IMAGE CATH FLUID COLXN VISC: CPT

## 2024-03-25 PROCEDURE — 99233 SBSQ HOSP IP/OBS HIGH 50: CPT | Performed by: SURGERY

## 2024-03-25 PROCEDURE — 2550000001 HC RX 255 CONTRASTS: Performed by: SURGERY

## 2024-03-25 PROCEDURE — 2500000005 HC RX 250 GENERAL PHARMACY W/O HCPCS: Performed by: RADIOLOGY

## 2024-03-25 PROCEDURE — C1729 CATH, DRAINAGE: HCPCS

## 2024-03-25 PROCEDURE — 74177 CT ABD & PELVIS W/CONTRAST: CPT

## 2024-03-25 PROCEDURE — 2720000007 HC OR 272 NO HCPCS

## 2024-03-25 PROCEDURE — C9113 INJ PANTOPRAZOLE SODIUM, VIA: HCPCS

## 2024-03-25 PROCEDURE — 49406 IMAGE CATH FLUID PERI/RETRO: CPT | Performed by: RADIOLOGY

## 2024-03-25 PROCEDURE — 74177 CT ABD & PELVIS W/CONTRAST: CPT | Performed by: RADIOLOGY

## 2024-03-25 PROCEDURE — 99232 SBSQ HOSP IP/OBS MODERATE 35: CPT | Performed by: PHYSICIAN ASSISTANT

## 2024-03-25 PROCEDURE — 2500000004 HC RX 250 GENERAL PHARMACY W/ HCPCS (ALT 636 FOR OP/ED): Performed by: PHYSICIAN ASSISTANT

## 2024-03-25 PROCEDURE — 87070 CULTURE OTHR SPECIMN AEROBIC: CPT | Mod: SAMLAB | Performed by: SURGERY

## 2024-03-25 PROCEDURE — 85027 COMPLETE CBC AUTOMATED: CPT

## 2024-03-25 PROCEDURE — 36415 COLL VENOUS BLD VENIPUNCTURE: CPT

## 2024-03-25 PROCEDURE — 2500000004 HC RX 250 GENERAL PHARMACY W/ HCPCS (ALT 636 FOR OP/ED)

## 2024-03-25 PROCEDURE — 0W9H30Z DRAINAGE OF RETROPERITONEUM WITH DRAINAGE DEVICE, PERCUTANEOUS APPROACH: ICD-10-PCS | Performed by: SURGERY

## 2024-03-25 RX ORDER — DEXTROSE MONOHYDRATE, SODIUM CHLORIDE, SODIUM LACTATE, POTASSIUM CHLORIDE, CALCIUM CHLORIDE 5; 600; 310; 179; 20 G/100ML; MG/100ML; MG/100ML; MG/100ML; MG/100ML
125 INJECTION, SOLUTION INTRAVENOUS CONTINUOUS
Status: DISCONTINUED | OUTPATIENT
Start: 2024-03-25 | End: 2024-03-25 | Stop reason: CLARIF

## 2024-03-25 RX ORDER — MAGNESIUM SULFATE HEPTAHYDRATE 40 MG/ML
2 INJECTION, SOLUTION INTRAVENOUS ONCE
Status: COMPLETED | OUTPATIENT
Start: 2024-03-25 | End: 2024-03-25

## 2024-03-25 RX ORDER — DEXTROSE, SODIUM CHLORIDE, SODIUM LACTATE, POTASSIUM CHLORIDE, AND CALCIUM CHLORIDE 5; .6; .31; .03; .02 G/100ML; G/100ML; G/100ML; G/100ML; G/100ML
125 INJECTION, SOLUTION INTRAVENOUS CONTINUOUS
Status: DISCONTINUED | OUTPATIENT
Start: 2024-03-25 | End: 2024-03-26

## 2024-03-25 RX ORDER — POTASSIUM CHLORIDE 14.9 MG/ML
20 INJECTION INTRAVENOUS ONCE
Status: DISCONTINUED | OUTPATIENT
Start: 2024-03-25 | End: 2024-03-25

## 2024-03-25 RX ORDER — LIDOCAINE HYDROCHLORIDE 20 MG/ML
INJECTION, SOLUTION EPIDURAL; INFILTRATION; INTRACAUDAL; PERINEURAL
Status: COMPLETED | OUTPATIENT
Start: 2024-03-25 | End: 2024-03-25

## 2024-03-25 RX ORDER — POTASSIUM CHLORIDE 14.9 MG/ML
20 INJECTION INTRAVENOUS
Status: COMPLETED | OUTPATIENT
Start: 2024-03-25 | End: 2024-03-25

## 2024-03-25 RX ADMIN — POTASSIUM CHLORIDE 20 MEQ: 14.9 INJECTION, SOLUTION INTRAVENOUS at 15:43

## 2024-03-25 RX ADMIN — PIPERACILLIN SODIUM AND TAZOBACTAM SODIUM 3.38 G: 3; .375 INJECTION, SOLUTION INTRAVENOUS at 09:49

## 2024-03-25 RX ADMIN — SODIUM CHLORIDE 125 ML/HR: 9 INJECTION, SOLUTION INTRAVENOUS at 01:48

## 2024-03-25 RX ADMIN — PANTOPRAZOLE SODIUM 40 MG: 40 INJECTION, POWDER, FOR SOLUTION INTRAVENOUS at 06:30

## 2024-03-25 RX ADMIN — ACETAMINOPHEN 650 MG: 325 TABLET ORAL at 15:47

## 2024-03-25 RX ADMIN — PIPERACILLIN SODIUM AND TAZOBACTAM SODIUM 3.38 G: 3; .375 INJECTION, SOLUTION INTRAVENOUS at 15:09

## 2024-03-25 RX ADMIN — KETOROLAC TROMETHAMINE 30 MG: 30 INJECTION, SOLUTION INTRAMUSCULAR; INTRAVENOUS at 12:02

## 2024-03-25 RX ADMIN — ENOXAPARIN SODIUM 40 MG: 40 INJECTION SUBCUTANEOUS at 13:17

## 2024-03-25 RX ADMIN — PIPERACILLIN SODIUM AND TAZOBACTAM SODIUM 3.38 G: 3; .375 INJECTION, SOLUTION INTRAVENOUS at 21:21

## 2024-03-25 RX ADMIN — IOHEXOL 68 ML: 350 INJECTION, SOLUTION INTRAVENOUS at 08:06

## 2024-03-25 RX ADMIN — KETOROLAC TROMETHAMINE 30 MG: 30 INJECTION, SOLUTION INTRAMUSCULAR; INTRAVENOUS at 18:19

## 2024-03-25 RX ADMIN — LIDOCAINE HYDROCHLORIDE 10 ML: 20 INJECTION, SOLUTION EPIDURAL; INFILTRATION; INTRACAUDAL; PERINEURAL at 12:35

## 2024-03-25 RX ADMIN — KETOROLAC TROMETHAMINE 30 MG: 30 INJECTION, SOLUTION INTRAMUSCULAR; INTRAVENOUS at 01:47

## 2024-03-25 RX ADMIN — POTASSIUM CHLORIDE 20 MEQ: 14.9 INJECTION, SOLUTION INTRAVENOUS at 10:39

## 2024-03-25 RX ADMIN — MAGNESIUM SULFATE HEPTAHYDRATE 2 G: 40 INJECTION, SOLUTION INTRAVENOUS at 13:03

## 2024-03-25 RX ADMIN — PIPERACILLIN SODIUM AND TAZOBACTAM SODIUM 3.38 G: 3; .375 INJECTION, SOLUTION INTRAVENOUS at 03:59

## 2024-03-25 RX ADMIN — ACETAMINOPHEN 650 MG: 325 TABLET ORAL at 21:20

## 2024-03-25 RX ADMIN — SODIUM CHLORIDE, SODIUM LACTATE, POTASSIUM CHLORIDE, CALCIUM CHLORIDE AND DEXTROSE MONOHYDRATE 125 ML/HR: 5; 600; 310; 30; 20 INJECTION, SOLUTION INTRAVENOUS at 13:02

## 2024-03-25 RX ADMIN — SODIUM CHLORIDE, SODIUM LACTATE, POTASSIUM CHLORIDE, CALCIUM CHLORIDE AND DEXTROSE MONOHYDRATE 125 ML/HR: 5; 600; 310; 30; 20 INJECTION, SOLUTION INTRAVENOUS at 21:20

## 2024-03-25 ASSESSMENT — COGNITIVE AND FUNCTIONAL STATUS - GENERAL
MOBILITY SCORE: 24
DAILY ACTIVITIY SCORE: 24

## 2024-03-25 ASSESSMENT — PAIN SCALES - GENERAL
PAINLEVEL_OUTOF10: 3
PAINLEVEL_OUTOF10: 3
PAINLEVEL_OUTOF10: 0 - NO PAIN
PAINLEVEL_OUTOF10: 4
PAINLEVEL_OUTOF10: 2
PAINLEVEL_OUTOF10: 0 - NO PAIN
PAINLEVEL_OUTOF10: 4
PAINLEVEL_OUTOF10: 0 - NO PAIN
PAINLEVEL_OUTOF10: 0 - NO PAIN
PAINLEVEL_OUTOF10: 3

## 2024-03-25 ASSESSMENT — PAIN - FUNCTIONAL ASSESSMENT
PAIN_FUNCTIONAL_ASSESSMENT: 0-10

## 2024-03-25 ASSESSMENT — PAIN DESCRIPTION - LOCATION
LOCATION: ABDOMEN

## 2024-03-25 ASSESSMENT — PAIN SCALES - WONG BAKER: WONGBAKER_NUMERICALRESPONSE: HURTS LITTLE BIT

## 2024-03-25 ASSESSMENT — PAIN DESCRIPTION - DESCRIPTORS: DESCRIPTORS: CRAMPING

## 2024-03-25 ASSESSMENT — PAIN DESCRIPTION - ORIENTATION: ORIENTATION: LOWER

## 2024-03-25 NOTE — PROGRESS NOTES
Pt reviewed in care rounds this morning. Pt not medically ready for discharge. ADOD 24-48 hours. Plan is for Pt to discharge to home, no need for additional supports or services.  SW to follow.

## 2024-03-25 NOTE — PROGRESS NOTES
"Ramesh Amaya is a 38 y.o. male on day 4 of admission presenting with Perforation of sigmoid colon due to diverticulitis.    Subjective     Patient is laying in bed in no distress.  He continues to have recurrent of left lower quadrant pain.  He has continued to pass small liquid stools.  He was able to ambulate in the hallways.  He did have clear liquids yesterday.       Objective     Physical Exam  General Appearance: AAO x 3, not in acute distress, anxious  Skin: skin color pink, warm, and dry; no suspicious rashes or lesions  Eyes : PERRL, EOM's intact  ENT: mucous membranes pink and moist  Neck: normocephalic  Respiratory: lungs clear to auscultation anteriorly; no wheezing, rhonchi, or crackles.   Heart: regular rate and rhythm. Telemetry shows sinus rhythm  Abdomen: nondistended , positive bowel sounds x4, soft,  tenderness with palpation to lower abdomen and LLQ  Extremities: no edema   Peripheral pulses: normal x4 extremities  Neuro: alert, coherent and conversant, no focal motor deficits    Last Recorded Vitals  Blood pressure (!) 169/101, pulse 83, temperature 36.8 °C (98.2 °F), temperature source Temporal, resp. rate 18, height 1.702 m (5' 7\"), weight 90.7 kg (200 lb), SpO2 99 %.  Intake/Output last 3 Shifts:  I/O last 3 completed shifts:  In: 8225.4 (90.7 mL/kg) [P.O.:840; I.V.:6985.4 (77 mL/kg); IV Piggyback:400]  Out: 2 (0 mL/kg) [Urine:2 (0 mL/kg/hr)]  Weight: 90.7 kg     Relevant Results  Scheduled medications  enoxaparin, 40 mg, subcutaneous, q24h  magnesium sulfate, 2 g, intravenous, Once  pantoprazole, 40 mg, intravenous, Daily before breakfast  piperacillin-tazobactam, 3.375 g, intravenous, q6h  potassium chloride, 20 mEq, intravenous, q2h      Continuous medications  dextrose 5 % and lactated Ringer's, 125 mL/hr      PRN medications  PRN medications: acetaminophen, diazePAM, hydrALAZINE, ketorolac, morphine, ondansetron ODT **OR** ondansetron    Results for orders placed or performed during " the hospital encounter of 03/21/24 (from the past 24 hour(s))   CBC   Result Value Ref Range    WBC 11.9 (H) 4.4 - 11.3 x10*3/uL    nRBC 0.0 0.0 - 0.0 /100 WBCs    RBC 4.58 4.50 - 5.90 x10*6/uL    Hemoglobin 14.8 13.5 - 17.5 g/dL    Hematocrit 41.2 41.0 - 52.0 %    MCV 90 80 - 100 fL    MCH 32.3 26.0 - 34.0 pg    MCHC 35.9 32.0 - 36.0 g/dL    RDW 11.1 (L) 11.5 - 14.5 %    Platelets 404 150 - 450 x10*3/uL   Basic Metabolic Panel   Result Value Ref Range    Glucose 87 74 - 99 mg/dL    Sodium 135 (L) 136 - 145 mmol/L    Potassium 3.4 (L) 3.5 - 5.3 mmol/L    Chloride 105 98 - 107 mmol/L    Bicarbonate 20 (L) 21 - 32 mmol/L    Anion Gap 13 10 - 20 mmol/L    Urea Nitrogen 6 6 - 23 mg/dL    Creatinine 0.63 0.50 - 1.30 mg/dL    eGFR >90 >60 mL/min/1.73m*2    Calcium 8.5 (L) 8.6 - 10.3 mg/dL       CT abdomen pelvis w IV contrast    Result Date: 3/25/2024  Interpreted By:  Aristeo Gerardo, STUDY: CT ABDOMEN PELVIS W IV CONTRAST;  3/25/2024 8:04 am   INDICATION: Signs/Symptoms:Diverticulitis with microperforation. Persistent pain and low grade leukocytosis. Please evaluate for abscess..   COMPARISON: 03/21/2024   ACCESSION NUMBER(S): [id number]   ORDERING CLINICIAN: MINE MEDELLIN   TECHNIQUE: CT of the abdomen and pelvis was performed.  Contiguous axial images were obtained at 3 mm slice thickness through the abdomen and pelvis. Coronal and sagittal reconstructions at 3 mm slice thickness were performed. 68 mL Omnipaque 350 administered intravenously without immediate complication.   FINDINGS: LOWER CHEST: Partially imaged ground-glass nodular infiltrate involving the right lower lobe posteriorly. Clear left lung base.   ABDOMEN:   LIVER: Punctate cysts in the left lobe.   BILE DUCTS: Not dilated.   GALLBLADDER: No calcified stone or definite inflammation.   PANCREAS: Unremarkable   SPLEEN: Unremarkable   ADRENAL GLANDS: Unremarkable   KIDNEYS AND URETERS: Unremarkable without hydronephrosis.   PELVIS:   BLADDER: Partially  distended.   REPRODUCTIVE ORGANS: Coarse calcifications of the prostate are noted.   BOWEL: No findings of bowel obstruction or inflammation.    Unremarkable appendix.    Unremarkable mesentery.   VESSELS: Aortoiliac system is patent without aneurysm.  Major visceral branches are patent.Mild atherosclerosis. IVC and major branches are grossly patent. Major portal venous branches are patent.   PERITONEUM AND RETROPERITONEUM: At the site of the previous perisigmoid free air, there is a loculated air/fluid collection measuring 5.9 x 4.8 x 3.7 cm. No abdominal or pelvic lymphadenopathy.   BONE AND ABDOMINAL WALL: No acute skeletal findings.   Degenerative changes of the spine.         1.  Interval development of 6 cm perisigmoid loculated air/fluid collection at the site of previous free air. 2. Partially imaged infectious or inflammatory pneumonitis in the right lower lobe.   MACRO: None.   Signed by: Aristeo Gerardo 3/25/2024 10:57 AM Dictation workstation:   [id number]                                Assessment/Plan   Principal Problem:    Perforation of sigmoid colon due to diverticulitis    Ramesh Amaya is a 38 y.o. male admitted with diverticulitis with microperforation in the setting of asthma,  discoid lupus, daily alcohol drinker.           Diverticulitis with microperforation of sigmoid colon  -Dr Han consulted- appreciate recommendations  -CT scan performed today shows a development of 6 cm perisigmoid loculated air/fluid collection at the site of previous free air and a partially imaged infectious or inflammatory pneumonitis in the right lower lobe.  -Dr. Esquivel reviewed CT and plans to place a drain today  -White count 11.9  -After the procedure patient can resume clear liquids.  -continue zosyn day 5  -continue IV fluids-fluids changed to dextrose with LR due to prolonged decreased oral intake and low bicarb. (patient states he has not ate or drink well for approximately 9 days.  And other than  yesterday had about 5 days of not eating at all.)   -Hypokalemia-replace with 40 mEq of IV potassium  -consulted dietitian for education. Significant other and patient asking for diet recommendations after discharge  -patient has decrease in pain. Using toradol as needed  -continue to monitor labs.    -Ideally  keep potassium greater than 4 and magnesium greater than 2.  -continue protonix  -Patient walking in room and encouraged him to walk in hallway  -Patient verbalizes concern that his stress level is causing inflammation and feels that if his stress is better controlled he will be able to be discharged home        ETOH abuse  -monitor for withdrawal. (No symptoms presently ) CIWA not indicated.  Drinks 6 beers daily. Verbalizes large amount of stress  -Patient verbalized that he has not slept well since hospitalization but is refusing additional medication..  Time spent in conversation reviewing alcohol use and alternative ways to help with stress management.  Would benefit from medication at discharge but is hesitant  -Patient would benefit from outpatient counseling to assist with stress management but not sure that he would be agreeable.     Hypertension  -Continue hydralazine as needed  -Patient refuses NicoDerm patch for smoking use     Discharge disposition:  anticipate discharge home when clinically stable             I spent 30 minutes in the professional and overall care of this patient.      Iram Anders PA-C

## 2024-03-25 NOTE — CARE PLAN
The patient's goals for the shift include use the call light    The clinical goals for the shift include to remain comfortable throughout the shift    Pt comfortable today. Received abd drain

## 2024-03-25 NOTE — PROGRESS NOTES
"General Surgery Progress Note    Tolerated clear liquids yesterday, but with persistent abdominal cramping. Continues to pass small liquid stools. Ambulated in the hallways yesterday. Remains afebrile, no tachycardia.    /82 (BP Location: Right arm, Patient Position: Lying)   Pulse 82   Temp 36.4 °C (97.5 °F)   Resp 16   Ht 1.702 m (5' 7\")   Wt 90.7 kg (200 lb)   SpO2 98%   BMI 31.32 kg/m²   NAD, sitting semi-upright in bed, looks well  No labored breathing, on room air   NSR  Abdomen soft, non-distended, non-tender to deep palpation    Intake/Output Summary (Last 24 hours) at 3/25/2024 0658  Last data filed at 3/25/2024 0635  Gross per 24 hour   Intake 7875.41 ml   Output --   Net 7875.41 ml     WBC 11.9  Hgb 14.8  Cr 0.63  K 3.4    Patient is a 38 y.o. male with diverticulitis with microperforation. Clinically looks well and non-tender to deep palpation on today's exam, but continues to have cramping abdominal pain and persistent low grade leukocytosis. Will therefore repeat CT scan this AM to evaluate for possible abscess. Will continue antibiotics. Needs potassium replacement.     Marcella Han MD  03/25/24  7:01 AM          "

## 2024-03-26 ENCOUNTER — PHARMACY VISIT (OUTPATIENT)
Dept: PHARMACY | Facility: CLINIC | Age: 39
End: 2024-03-26
Payer: MEDICARE

## 2024-03-26 VITALS
OXYGEN SATURATION: 98 % | SYSTOLIC BLOOD PRESSURE: 155 MMHG | BODY MASS INDEX: 31.39 KG/M2 | WEIGHT: 200 LBS | RESPIRATION RATE: 16 BRPM | HEIGHT: 67 IN | HEART RATE: 91 BPM | DIASTOLIC BLOOD PRESSURE: 92 MMHG | TEMPERATURE: 97.9 F

## 2024-03-26 LAB
ALBUMIN SERPL BCP-MCNC: 3.3 G/DL (ref 3.4–5)
ALP SERPL-CCNC: 48 U/L (ref 33–120)
ALT SERPL W P-5'-P-CCNC: 43 U/L (ref 10–52)
ANION GAP SERPL CALC-SCNC: 10 MMOL/L (ref 10–20)
AST SERPL W P-5'-P-CCNC: 34 U/L (ref 9–39)
BASOPHILS # BLD MANUAL: 0.09 X10*3/UL (ref 0–0.1)
BASOPHILS NFR BLD MANUAL: 1 %
BILIRUB SERPL-MCNC: 0.4 MG/DL (ref 0–1.2)
BUN SERPL-MCNC: 6 MG/DL (ref 6–23)
CALCIUM SERPL-MCNC: 8.3 MG/DL (ref 8.6–10.3)
CHLORIDE SERPL-SCNC: 105 MMOL/L (ref 98–107)
CO2 SERPL-SCNC: 23 MMOL/L (ref 21–32)
CREAT SERPL-MCNC: 0.64 MG/DL (ref 0.5–1.3)
EGFRCR SERPLBLD CKD-EPI 2021: >90 ML/MIN/1.73M*2
EOSINOPHIL # BLD MANUAL: 0.17 X10*3/UL (ref 0–0.7)
EOSINOPHIL NFR BLD MANUAL: 2 %
ERYTHROCYTE [DISTWIDTH] IN BLOOD BY AUTOMATED COUNT: 11.1 % (ref 11.5–14.5)
GLUCOSE SERPL-MCNC: 110 MG/DL (ref 74–99)
HCT VFR BLD AUTO: 38.9 % (ref 41–52)
HGB BLD-MCNC: 14.1 G/DL (ref 13.5–17.5)
IMM GRANULOCYTES # BLD AUTO: 0.04 X10*3/UL (ref 0–0.7)
IMM GRANULOCYTES NFR BLD AUTO: 0.5 % (ref 0–0.9)
LYMPHOCYTES # BLD MANUAL: 1.19 X10*3/UL (ref 1.2–4.8)
LYMPHOCYTES NFR BLD MANUAL: 14 %
MAGNESIUM SERPL-MCNC: 1.99 MG/DL (ref 1.6–2.4)
MCH RBC QN AUTO: 32.7 PG (ref 26–34)
MCHC RBC AUTO-ENTMCNC: 36.2 G/DL (ref 32–36)
MCV RBC AUTO: 90 FL (ref 80–100)
MONOCYTES # BLD MANUAL: 0.68 X10*3/UL (ref 0.1–1)
MONOCYTES NFR BLD MANUAL: 8 %
NEUTROPHILS # BLD MANUAL: 6.38 X10*3/UL (ref 1.2–7.7)
NEUTS BAND # BLD MANUAL: 0.09 X10*3/UL (ref 0–0.7)
NEUTS BAND NFR BLD MANUAL: 1 %
NEUTS SEG # BLD MANUAL: 6.29 X10*3/UL (ref 1.2–7)
NEUTS SEG NFR BLD MANUAL: 74 %
NRBC BLD-RTO: 0 /100 WBCS (ref 0–0)
PHOSPHATE SERPL-MCNC: 3.2 MG/DL (ref 2.5–4.9)
PLATELET # BLD AUTO: 412 X10*3/UL (ref 150–450)
POTASSIUM SERPL-SCNC: 3.3 MMOL/L (ref 3.5–5.3)
PROT SERPL-MCNC: 6.7 G/DL (ref 6.4–8.2)
RBC # BLD AUTO: 4.31 X10*6/UL (ref 4.5–5.9)
RBC MORPH BLD: ABNORMAL
SODIUM SERPL-SCNC: 135 MMOL/L (ref 136–145)
TOTAL CELLS COUNTED BLD: 100
WBC # BLD AUTO: 8.5 X10*3/UL (ref 4.4–11.3)

## 2024-03-26 PROCEDURE — 2500000004 HC RX 250 GENERAL PHARMACY W/ HCPCS (ALT 636 FOR OP/ED)

## 2024-03-26 PROCEDURE — 84100 ASSAY OF PHOSPHORUS: CPT | Performed by: PHYSICIAN ASSISTANT

## 2024-03-26 PROCEDURE — 80053 COMPREHEN METABOLIC PANEL: CPT | Performed by: PHYSICIAN ASSISTANT

## 2024-03-26 PROCEDURE — 36415 COLL VENOUS BLD VENIPUNCTURE: CPT | Performed by: PHYSICIAN ASSISTANT

## 2024-03-26 PROCEDURE — 83735 ASSAY OF MAGNESIUM: CPT | Performed by: PHYSICIAN ASSISTANT

## 2024-03-26 PROCEDURE — 85027 COMPLETE CBC AUTOMATED: CPT | Performed by: PHYSICIAN ASSISTANT

## 2024-03-26 PROCEDURE — 2500000004 HC RX 250 GENERAL PHARMACY W/ HCPCS (ALT 636 FOR OP/ED): Performed by: PHYSICIAN ASSISTANT

## 2024-03-26 PROCEDURE — 2500000002 HC RX 250 W HCPCS SELF ADMINISTERED DRUGS (ALT 637 FOR MEDICARE OP, ALT 636 FOR OP/ED)

## 2024-03-26 PROCEDURE — 2500000001 HC RX 250 WO HCPCS SELF ADMINISTERED DRUGS (ALT 637 FOR MEDICARE OP)

## 2024-03-26 PROCEDURE — 99231 SBSQ HOSP IP/OBS SF/LOW 25: CPT

## 2024-03-26 PROCEDURE — 99233 SBSQ HOSP IP/OBS HIGH 50: CPT | Performed by: SURGERY

## 2024-03-26 PROCEDURE — C9113 INJ PANTOPRAZOLE SODIUM, VIA: HCPCS

## 2024-03-26 PROCEDURE — RXMED WILLOW AMBULATORY MEDICATION CHARGE

## 2024-03-26 PROCEDURE — 85007 BL SMEAR W/DIFF WBC COUNT: CPT | Performed by: PHYSICIAN ASSISTANT

## 2024-03-26 RX ORDER — POTASSIUM CHLORIDE 20 MEQ/1
40 TABLET, EXTENDED RELEASE ORAL ONCE
Status: COMPLETED | OUTPATIENT
Start: 2024-03-26 | End: 2024-03-26

## 2024-03-26 RX ORDER — AMOXICILLIN AND CLAVULANATE POTASSIUM 875; 125 MG/1; MG/1
1 TABLET, FILM COATED ORAL EVERY 12 HOURS SCHEDULED
Qty: 16 TABLET | Refills: 0 | Status: SHIPPED | OUTPATIENT
Start: 2024-03-26 | End: 2024-04-03

## 2024-03-26 RX ORDER — SODIUM CHLORIDE 0.9 % (FLUSH) 0.9 %
10 SYRINGE (ML) INJECTION 2 TIMES DAILY
Qty: 300 ML | Refills: 0 | Status: SHIPPED | OUTPATIENT
Start: 2024-03-26 | End: 2024-04-15 | Stop reason: ALTCHOICE

## 2024-03-26 RX ORDER — AMOXICILLIN AND CLAVULANATE POTASSIUM 875; 125 MG/1; MG/1
1 TABLET, FILM COATED ORAL EVERY 12 HOURS SCHEDULED
Status: DISCONTINUED | OUTPATIENT
Start: 2024-03-26 | End: 2024-03-26 | Stop reason: HOSPADM

## 2024-03-26 RX ADMIN — PANTOPRAZOLE SODIUM 40 MG: 40 INJECTION, POWDER, FOR SOLUTION INTRAVENOUS at 06:17

## 2024-03-26 RX ADMIN — ENOXAPARIN SODIUM 40 MG: 40 INJECTION SUBCUTANEOUS at 11:52

## 2024-03-26 RX ADMIN — POTASSIUM CHLORIDE 40 MEQ: 1500 TABLET, EXTENDED RELEASE ORAL at 10:08

## 2024-03-26 RX ADMIN — KETOROLAC TROMETHAMINE 30 MG: 30 INJECTION, SOLUTION INTRAMUSCULAR; INTRAVENOUS at 00:31

## 2024-03-26 RX ADMIN — KETOROLAC TROMETHAMINE 30 MG: 30 INJECTION, SOLUTION INTRAMUSCULAR; INTRAVENOUS at 08:59

## 2024-03-26 RX ADMIN — PIPERACILLIN SODIUM AND TAZOBACTAM SODIUM 3.38 G: 3; .375 INJECTION, SOLUTION INTRAVENOUS at 04:53

## 2024-03-26 RX ADMIN — AMOXICILLIN AND CLAVULANATE POTASSIUM 1 TABLET: 875; 125 TABLET, FILM COATED ORAL at 13:31

## 2024-03-26 RX ADMIN — SODIUM CHLORIDE, SODIUM LACTATE, POTASSIUM CHLORIDE, CALCIUM CHLORIDE AND DEXTROSE MONOHYDRATE 125 ML/HR: 5; 600; 310; 30; 20 INJECTION, SOLUTION INTRAVENOUS at 06:16

## 2024-03-26 ASSESSMENT — COGNITIVE AND FUNCTIONAL STATUS - GENERAL
DAILY ACTIVITIY SCORE: 24
MOBILITY SCORE: 24

## 2024-03-26 ASSESSMENT — PAIN DESCRIPTION - ORIENTATION: ORIENTATION: LOWER;LEFT

## 2024-03-26 ASSESSMENT — PAIN SCALES - GENERAL
PAINLEVEL_OUTOF10: 4
PAINLEVEL_OUTOF10: 0 - NO PAIN
PAINLEVEL_OUTOF10: 4

## 2024-03-26 ASSESSMENT — PAIN - FUNCTIONAL ASSESSMENT
PAIN_FUNCTIONAL_ASSESSMENT: 0-10

## 2024-03-26 ASSESSMENT — PAIN DESCRIPTION - LOCATION
LOCATION: ABDOMEN
LOCATION: ABDOMEN

## 2024-03-26 NOTE — PROGRESS NOTES
According to care rounds this morning if this pt tolerated lunch and he and his girlfriend learned how to flush his drain  and properly care for the site he could be discharged today. [facility] 24/24. ADOD is today. CT to follow. Angela Weathers BSN/RN-TCC

## 2024-03-26 NOTE — CARE PLAN
Problem: Discharge Planning  Goal: Discharge to home or other facility with appropriate resources  Outcome: Progressing     Problem: Chronic Conditions and Co-morbidities  Goal: Patient's chronic conditions and co-morbidity symptoms are monitored and maintained or improved  Outcome: Progressing     Problem: Pain  Goal: Walks with improved pain control throughout the shift  Outcome: Progressing  Goal: Performs ADL's with improved pain control throughout shift  Outcome: Progressing  Goal: Participates in PT with improved pain control throughout the shift  Outcome: Progressing  Goal: Free from opioid side effects throughout the shift  Outcome: Progressing  Goal: Free from acute confusion related to pain meds throughout the shift  Outcome: Progressing     Problem: Nutrition  Goal: Less than 5 days NPO/clear liquids  Outcome: Progressing  Goal: Lab values WNL  Outcome: Progressing  Goal: Electrolytes WNL  Outcome: Progressing  Goal: Maintain stable weight  Outcome: Progressing   The patient's goals for the shift include use the call light    The clinical goals for the shift include to remain comfortable throughout the shift    Pt reports abdominal pain/cramping. PRN pain medication seems to help.

## 2024-03-26 NOTE — NURSING NOTE
Discharge Note: 3/26/2024 1522 Discharge instructions and pt responsibilities reviewed with pt, significant other, and copy given. Wound care, drain care, education reviewed with pt, significant other, and information sheets given. Pt and significant other, verbalizes understanding of instructions received, verbalizes understanding of when to seek medical attention, denies any home going or personal care needs. Denies further questions or concerns. Both state are comfortable with drain flushing and emptying, denies further instruction. Reviewed follow up appts with pt and verbalizes understanding. Declines wheelchair, ambulates to elevator accompanied by significant other, personal belongings taken with pt, no distress noted, no complaints voiced. Herbert CID

## 2024-03-26 NOTE — CARE PLAN
Problem: Discharge Planning  Goal: Discharge to home or other facility with appropriate resources  Outcome: Progressing     Problem: Chronic Conditions and Co-morbidities  Goal: Patient's chronic conditions and co-morbidity symptoms are monitored and maintained or improved  Outcome: Progressing     Problem: Pain  Goal: Walks with improved pain control throughout the shift  Outcome: Progressing  Goal: Performs ADL's with improved pain control throughout shift  Outcome: Progressing  Goal: Participates in PT with improved pain control throughout the shift  Outcome: Progressing  Goal: Free from opioid side effects throughout the shift  Outcome: Progressing  Goal: Free from acute confusion related to pain meds throughout the shift  Outcome: Progressing     Problem: Nutrition  Goal: Less than 5 days NPO/clear liquids  Outcome: Progressing  Goal: Lab values WNL  Outcome: Progressing  Goal: Electrolytes WNL  Outcome: Progressing  Goal: Maintain stable weight  Outcome: Progressing   The patient's goals for the shift include use the call light    The clinical goals for the shift include to remain comfortable throughout the shift    Over the shift, the patient did not make progress toward the following goals. Barriers to progression include . Recommendations to address these barriers include .

## 2024-03-26 NOTE — DISCHARGE SUMMARY
Discharge Diagnosis  Perforation of sigmoid colon due to diverticulitis    Issues Requiring Follow-Up  Intra-abdominal abscess    Discharge Meds     Your medication list        START taking these medications        Instructions Last Dose Given Next Dose Due   amoxicillin-pot clavulanate 875-125 mg tablet  Commonly known as: Augmentin      Take 1 tablet by mouth every 12 hours for 8 days.       Normal Saline Flush flush  Generic drug: sodium chloride 0.9%      Infuse 10 mL into a venous catheter 2 times a day.                 Where to Get Your Medications        These medications were sent to [facility]  [address] [place] [address]      Hours: 8 AM to 5:30 Mon-Fri, 8 AM to 4 PM Saturday and Sunday Phone: [phone]   amoxicillin-pot clavulanate 875-125 mg tablet  Normal Saline Flush flush         Test Results Pending At Discharge  Pending Labs       Order Current Status    Sterile Fluid Culture/Smear In process            Hospital Course   Ramesh Amaya is a 38 y.o. male with past medical history of asthma and discoid lupus presented to [facility] ED with complaints of abdominal pain. UA collected and negative for UTI but showed protein and ketones. CT abdomen and pelvis with IV contrast performed showed severe acute diverticulitis of the sigmoid colon with microperforation, small amount of free fluid and inflammatory stranding extending to the left pelvic sidewall. Emergency room spoke with Dr. Han recommendation was made to keep patient n.p.o. and continue Zosyn IV. Patient states he has not seen a physician in at least 5 years. Patient admitted to telemetry with a working diagnosis of diverticulitis with microperforation.    He had intra-abdominal drain placed yesterday.  Completed 6 days of IV antibiotics, discharged on oral Augmentin for 8 more days.  Advised to establish care with primary care in 3 days, will need follow-up on abscess cultures.  Advised to monitor blood pressure at  home and establish care with PCP for further management      Pertinent Physical Exam At Time of Discharge  Physical Exam  Physical Exam  General Appearance: AAO x 3, not in acute distress, anxious  Skin: skin color pink, warm, and dry; no suspicious rashes or lesions  Eyes : PERRL, EOM's intact  ENT: mucous membranes pink and moist  Neck: normocephalic  Respiratory: lungs clear to auscultation anteriorly; no wheezing, rhonchi, or crackles.   Heart: regular rate and rhythm. Telemetry shows sinus rhythm  Abdomen: nondistended , positive bowel sounds x4, soft,  tenderness with palpation to lower abdomen and LLQ  Extremities: no edema   Peripheral pulses: normal x4 extremities  Neuro: alert, coherent and conversant, no focal motor deficits  Outpatient Follow-Up  Future Appointments   Date Time Provider Department Center   4/5/2024  9:00 AM Marcella Han MD [id number] [facility]     Follow-up with Dr. Han as scheduled on April 5  Establish care with PCP in the 3 days      STEPHANIE Cabral-CNP

## 2024-03-26 NOTE — PROGRESS NOTES
Medication Education     Medication education for Ramesh Amaya was provided to the patient and family for the following medication(s):  Augmentin   Sterile Saline Flushes      Medication education provided by a Pharmacist:  ADR Counseling Medication interactions Dose, frequency, storage How to take and what to do if a dose is missed Proper dose, indication, possible ADRs Refilling the medication  How the medication works and benefits of taking it Importance of compliance Necessary labs and/or other monitoring Any drug interactions (including OTCs and herbvals) and importance of notifying a healthcare provider of any medication changes Potential duration of therapy    Identified potential barriers to education:  None    Method(s) of Education:  Verbal Written materials provided and reviewed    An opportunity to ask questions and receive answers was provided.     Assessment of understanding the patient and family:  2= meets goals/outcomes and Teach back    Additional Notes (if applicable):   - Encouraged patient to finish the full course of antibiotics so as to reduce the risk of recurrent/resistant infection  - Advised patient to return back to the ED right away if he develops any worsening abdominal pain, redness/irritation @ the drain site, marked change in quality/quantity of discharge from the drain site, or systemic signs/symptoms of infection (ex. Fever/chills)  - Patient was agreeable to have medicines filled @ [facility] as part of the Meds-to-Beds program; pharmacist filled and subsequently brought to the patient bedside 3/25 ~15:00    Gianluca Johnson PharmD, BCPS

## 2024-03-26 NOTE — DISCHARGE INSTR - OTHER ORDERS
Needs to flush drain twice daily with 10cc sterile saline flushes.     Needs to keep record of output (volume and characteristic).     Bring this with him to his follow up appointment with me.         Abscess Drainage, Percutaneous Discharge Instructions    About this topic  An abscess is a collection of pus under your skin. The doctors made a cut to open up the area and clean out the wound. Most of the time, the cut is not closed with stitches. The wound may be packed with a special gauze to keep it from closing too soon. Sometimes a small drain is placed.  Percutaneous abscess drainage is a procedure to drain an abscess with a needle or small tube. The needle or tube goes through the skin instead of having to make a large cut to drain the abscess.    What care is needed at home?  Ask your doctor what you need to do when you go home. Make sure you ask questions if you do not understand what the doctor says.  Keep your wound covered with gauze until it is fully healed.  If you do not have any packing, soak the wound in warm soapy water a few times each day.  If you have packing, do not soak the wound. Your regular doctor will change the packing during your appointment. This will need to be repeated until the wound stops draining. Then you can begin soaking it in warm soapy water a few times each day.  If you have a drain in place, the doctor will give you special instructions on how to care for the drain.  Keep your wound dry unless you are soaking it in warm soapy water as instructed.  If the doctors order an antibiotic for you, be sure to take all of them, even if you start to feel better.  What follow-up care is needed?  Ask your doctor what you need to do when you go home. Make sure you ask questions if you do not understand what the doctor says. This way you will know what you need to do.  You may go home with a drain tube. If so, the doctor will take the tube out after the drainage has stopped and the  infection is gone.  Be sure to take all of your drugs for infection. These are very important to make sure the infection is fully treated and it goes away completely.  What drugs may be needed?  The doctor may order drugs to:  Help with pain  Fight an infection  Will physical activity be limited?  You may have to limit your activity to help your wound heal. Talk to your doctor about the right amount of activity for you.  Splints may be needed if you have an abscess in your arm, hand, or leg. This will help control motion to let the wound heal faster.  What problems could happen?  Bleeding  More infection  Injury or infection to other organs near the affected area  Trouble passing urine or moving bowels if abscess is in the belly  Tube falls out or moves out of place (if drainage tube was left in)  What can be done to prevent this health problem?  Practice proper hygiene. Practice good hand washing, especially before and after touching the cut site.  Do not share towels, razors, and other personal things with someone else.  Be careful when you shave your face, underarms, or legs to avoid scratching or cutting your skin.  If you cut yourself, keep the area clean and put petroleum jelly on it.  If your infection is caused by MRSA, ask your doctor how to try to get rid of this bacteria.  When do I need to call the doctor?  You get a fever of 100.4°F (38°C) or higher and have a red rash all over your body with red eyes, diarrhea, and/or mouth sores.  You are confused or very sleepy.  To have your packing changed if your wound is packed.  You have a fever of 100.4°F (38°C) or higher or chills.  Your wound is swollen, red, or warm.  Your wound has thick yellow or green drainage.  Teach Back: Helping You Understand  The Teach Back Method helps you understand the information we are giving you. After you talk with the staff, tell them in your own words what you learned. This helps to make sure the staff has described each  thing clearly. It also helps to explain things that may have been confusing. Before going home, make sure you can do these:  I can tell you about my procedure.  I can tell you how to care for my cut site.  I can tell you what I will do if I have swelling, redness, or warmth around my wound.  Last Reviewed Date  2021-06-09

## 2024-03-26 NOTE — PROGRESS NOTES
"General Surgery Progress Note    Pain at drain insertion site in LLQ and some gas pains. Passing flatus. Afebrile. WBC normalized. Tolerating clear liquids.     BP (!) 138/91   Pulse 81   Temp 36.5 °C (97.7 °F)   Resp 18   Ht 1.702 m (5' 7\")   Wt 90.7 kg (200 lb)   SpO2 98%   BMI 31.32 kg/m²   NAD, laying supine in bed   No labored breathing, on room air   NSR  Abdomen soft, non-distended, tender in LLQ around drain site, drain holding accordion suction, purulent output, 50cc in last 24hrs, flushes without resistance    Intake/Output Summary (Last 24 hours) at 3/26/2024 0715  Last data filed at 3/26/2024 0600  Gross per 24 hour   Intake 1310 ml   Output 50 ml   Net 1260 ml     WBC 8.5  K 3.3    Patient is a 38 y.o. male with diverticulitis with microperforation. Clinically looks well. Leukocytosis resolved following drainage of abscess yesterday. Needs potassium replacement. Needs to flush drain twice daily with 10cc sterile saline flushes, and will need drain care teaching. Needs to keep record of output (volume and characteristic) and bring this with him to his follow up appointment with me. Could transition to oral antibiotics. Anticipate discharge home later today. Recommend staying on protein shakes / low residue diet. He will follow up with me next week.    Marcella Han MD  03/26/24  7:19 AM        "

## 2024-03-29 ENCOUNTER — APPOINTMENT (OUTPATIENT)
Dept: PRIMARY CARE | Facility: CLINIC | Age: 39
End: 2024-03-29
Payer: COMMERCIAL

## 2024-03-29 LAB
BACTERIA FLD CULT: ABNORMAL
GRAM STN SPEC: ABNORMAL
GRAM STN SPEC: ABNORMAL

## 2024-04-05 ENCOUNTER — OFFICE VISIT (OUTPATIENT)
Dept: SURGERY | Facility: CLINIC | Age: 39
End: 2024-04-05
Payer: COMMERCIAL

## 2024-04-05 ENCOUNTER — PREP FOR PROCEDURE (OUTPATIENT)
Dept: SURGERY | Facility: CLINIC | Age: 39
End: 2024-04-05

## 2024-04-05 VITALS
BODY MASS INDEX: 29.7 KG/M2 | HEART RATE: 107 BPM | SYSTOLIC BLOOD PRESSURE: 148 MMHG | DIASTOLIC BLOOD PRESSURE: 98 MMHG | WEIGHT: 189.2 LBS | HEIGHT: 67 IN

## 2024-04-05 DIAGNOSIS — K57.92 DIVERTICULITIS: Primary | ICD-10-CM

## 2024-04-05 PROCEDURE — 99213 OFFICE O/P EST LOW 20 MIN: CPT | Performed by: SURGERY

## 2024-04-05 NOTE — PROGRESS NOTES
General Surgery Follow-up Visit    Patient: Ramesh Amaya  : 1985  MRN: [id number]  Date of Visit: 24    Chief Complaint: Hospital follow-up, diverticulitis    History of Present Illness: Ramesh Amaya is a 38 y.o. old male seen in follow-up from recent hospitalization for perforated diverticulitis with an abscess.  He had IR drainage of the abscess.  Clinically, he improved.  He was discharged home on 3/26/24.  He has remained on a low residue diet.  He denies any fever.  Overall, he is feeling well.  He denies any fever, sweats or chills.  His left lower quadrant abdominal pain has completely resolved.  He continues to flush his drain twice daily.  He has had about 30 cc total output, and 20 cc of that is flush.  It no longer has a foul odor to it.  It is thin serosanguineous.  He is off of antibiotics as of 2 days ago.    Medical History:  Diverticulitis  Asthma  Cutaneous lupus  Obesity, BMI 31     Surgical History:  No previous abdominal surgery.   No previous colonoscopy.     Home Medications:  Prior to Admission medications    Medication Sig Start Date End Date Taking? Authorizing Provider   amoxicillin-pot clavulanate (Augmentin) 875-125 mg tablet Take 1 tablet by mouth every 12 hours for 8 days. 3/26/24 4/3/24  BRYAN Cabral   sodium chloride 0.9% (Normal Saline Flush) flush Infuse 10 mL into a venous catheter 2 times a day. 3/26/24   BRYAN Cabral     Allergies:  Peanut.   Suspect that this is not a true allergy.  Patient reports that he gets the lower abdominal pain when he eats peanuts.  He has always attributed this to an allergy, but likely is just his diverticulitis.     Family History:   No family history of colon or rectal cancer, inflammatory bowel disease, or diverticulitis.     Social History:  Smoker.     ROS:  Constitutional: No fever, sweats or chills  Cardiovascular: No chest pain  Respiratory: No cough or shortness of breath  Gastrointestinal: +  "LLQ pain (now resolved), denies any blood in the stool.   Genitourinary: Prepubic discomfort resolved  Musculoskeletal: no weakness or swelling  Integumentary: + Discoid lupus, on topical steroids intermittently but uses these judiciously given sun sensitivity  Neurological: no confusion  Endocrine: no heat or cold intolerance  Heme/Lymph: no easy bruising or bleeding    Objective:  BP (!) 148/98   Pulse 107   Ht 1.702 m (5' 7\")   Wt 85.8 kg (189 lb 3.2 oz)   BMI 29.63 kg/m²     Physical Exam:  Constitutional: No acute distress, conversant, pleasant, looks well, accompanied by his girlfriend  Neurologic: alert and oriented  Psych: appropriate affect  Ears, Nose, Mouth and Throat: mucus membranes moist  Pulmonary: No labored breathing  Cardiovascular: Regular rate and rhythm  Abdomen: soft, non-distended, IR drain in left lower quadrant with serosanguineous drainage, there is no purulence to this, holding bulb suction, nontender to palpation  Musculoskeletal: Moves all extremities, no edema  Skin: no jaundice    Labs/Imaging:  No new labs or imaging since hospital discharge.    Assessment and Plan: Ramesh Amaya is a 38 y.o. old male with diverticulitis with abscess.  Clinically, he looks well.  His pain has completely resolved.  I suspect that his abscess is completely resolved and the output is just from the flush.  I offered to remove it in the office today, but he was a little bit nervous about this, which is understandable.  I also think it is reasonable to wait until after the weekend so that if he were to have issues he would not end up in the emergency department on a weekend.  I will therefore see him back Monday morning at 8 AM with plans to remove his drain provided he continues to do well.  I have advised him that he can stop flushing the drain.    He has never had a colonoscopy, and I have recommended one.  We discussed the risks of this procedure.  This included risks of bleeding, perforation, " missed polyps, incomplete colonoscopy, and potential need for additional procedures pending findings.  He was agreeable to proceed.  Bowel prep instructions were reviewed with the patient and all of his questions were answered.  We will give this several more weeks for all acute inflammation to subside prior to colonoscopy.  He is scheduled for colonoscopy on 7/2/24.  We will do this at [facility] with the assistance of Anesthesiology (so the deeper sedation can be provided should he have any residual inflammation or difficulty getting through the sigmoid colon).     Marcella Han MD  4/5/2024

## 2024-04-08 ENCOUNTER — PROCEDURE VISIT (OUTPATIENT)
Dept: SURGERY | Facility: CLINIC | Age: 39
End: 2024-04-08
Payer: COMMERCIAL

## 2024-04-08 ENCOUNTER — DOCUMENTATION (OUTPATIENT)
Dept: SURGERY | Facility: CLINIC | Age: 39
End: 2024-04-08

## 2024-04-08 VITALS — SYSTOLIC BLOOD PRESSURE: 144 MMHG | HEART RATE: 96 BPM | DIASTOLIC BLOOD PRESSURE: 80 MMHG

## 2024-04-08 DIAGNOSIS — K57.92 DIVERTICULITIS: Primary | ICD-10-CM

## 2024-04-08 PROCEDURE — 99212 OFFICE O/P EST SF 10 MIN: CPT | Performed by: SURGERY

## 2024-04-08 NOTE — PROGRESS NOTES
Notified patient of  Colonoscopy scheduled on 7-2-24 .Instructions reviewed. Advised patient P.A.T will contact them 24 hours before surgery with arrival time. Pt voices understanding. Alejandra Harper MA.    Pt r/s to 8-7-24 due to having  a flare up 2 weeks ago. He wants to wait 6 weeks to make sure he's not inflammed.    Pt r/s colon to 8/28/24.    Pt cx colonoscopy. He made an appt to come in and discuss sx with . Consent is in MS cx folder.

## 2024-04-08 NOTE — PROGRESS NOTES
General Surgery Follow-up Visit    Patient: Ramesh Amaya  : 1985  MRN: [id number]  Date of Visit: 24    Chief Complaint: Diverticular abscess     History of Present Illness: Ramesh Amaya is a 38 y.o. old male seen in follow-up. He had a recent hospitalization for perforated diverticulitis with an abscess.  He had IR drainage of the abscess.  Clinically, he improved.  He was discharged home on 3/26/24.  He has remained on a low residue diet.  He denies any fever.  Overall, he is feeling well.  He is off of antibiotics.  I saw him on Friday.  At that point, his drain had scant output but he was still flushing it.  Over the weekend, he stopped flushing it.  He remains afebrile.  He has no abdominal pain.  The drain just has serosanguineous output, about 10 cc total over the past 3 days.     Medical History:  Diverticulitis  Asthma  Cutaneous lupus  Obesity, BMI 31     Surgical History:  No previous abdominal surgery.   No previous colonoscopy.      Home Medications:  Prior to Admission medications    Medication Sig Start Date End Date Taking? Authorizing Provider   sodium chloride 0.9% (Normal Saline Flush) flush Infuse 10 mL into a venous catheter 2 times a day. 3/26/24  Yes BRYAN Cabral   amoxicillin-pot clavulanate (Augmentin) 875-125 mg tablet Take 1 tablet by mouth every 12 hours for 8 days. 3/26/24 4/3/24  BRYAN Cabral     Allergies:  is allergic to peanut.    Family History:   No family history of colon or rectal cancer, inflammatory bowel disease, or diverticulitis.     Social History:  Smoker.     ROS:  Constitutional: No fever, sweats or chills  Cardiovascular: No chest pain  Respiratory: No cough or shortness of breath  Gastrointestinal: + LLQ pain (now resolved), denies any blood in the stool.   Genitourinary: Prepubic discomfort resolved  Musculoskeletal: no weakness or swelling  Integumentary: + Discoid lupus, on topical steroids intermittently but uses  these judiciously given sun sensitivity  Neurological: no confusion  Endocrine: no heat or cold intolerance  Heme/Lymph: no easy bruising or bleeding    Objective:  /80   Pulse 96     Physical Exam:  Constitutional: No acute distress, conversant, pleasant, accompanied by his girlfriend  Neurologic: alert and oriented  Psych: appropriate affect  Ears, Nose, Mouth and Throat: mucus membranes moist  Pulmonary: No labored breathing  Cardiovascular: Regular rate and rhythm  Abdomen: soft, non-distended, BMI 29, non-tender, drain in left lower quadrant with about 10 cc of serosanguineous output, holding suction, this was removed without complication, dry dressing was placed.  Musculoskeletal: Moves all extremities, no edema  Skin: no jaundice    Labs/Imaging:  No new labs or imaging.    Assessment and Plan: Ramesh Amaya is a 38 y.o. old male with diverticulitis with abscess.  Clinically, he is doing well.  His pain has completely resolved.  He is off of antibiotics.  His drain was removed in the office today.  We discussed that if he were to develop recurrent pain or fever, that he needs to notify my office.  I will otherwise see him for colonoscopy on 7/2/24.     Marcella Han MD  4/8/2024

## 2024-04-15 ENCOUNTER — OFFICE VISIT (OUTPATIENT)
Dept: PRIMARY CARE | Facility: CLINIC | Age: 39
End: 2024-04-15
Payer: COMMERCIAL

## 2024-04-15 VITALS
HEART RATE: 90 BPM | DIASTOLIC BLOOD PRESSURE: 89 MMHG | WEIGHT: 190.3 LBS | SYSTOLIC BLOOD PRESSURE: 130 MMHG | BODY MASS INDEX: 29.87 KG/M2 | OXYGEN SATURATION: 99 % | HEIGHT: 67 IN

## 2024-04-15 DIAGNOSIS — L93.0 DISCOID LUPUS: ICD-10-CM

## 2024-04-15 DIAGNOSIS — R03.0 BLOOD PRESSURE ELEVATED WITHOUT HISTORY OF HTN: Primary | ICD-10-CM

## 2024-04-15 PROCEDURE — 99203 OFFICE O/P NEW LOW 30 MIN: CPT | Performed by: FAMILY MEDICINE

## 2024-04-15 PROCEDURE — 4004F PT TOBACCO SCREEN RCVD TLK: CPT | Performed by: FAMILY MEDICINE

## 2024-04-15 ASSESSMENT — ENCOUNTER SYMPTOMS
DIARRHEA: 0
ARTHRALGIAS: 0
ABDOMINAL PAIN: 0
NAUSEA: 0
SLEEP DISTURBANCE: 0
COUGH: 0
MYALGIAS: 0
HEADACHES: 0
CONSTIPATION: 0
SHORTNESS OF BREATH: 0
PALPITATIONS: 0
FATIGUE: 0
NERVOUS/ANXIOUS: 0

## 2024-04-15 ASSESSMENT — PATIENT HEALTH QUESTIONNAIRE - PHQ9
1. LITTLE INTEREST OR PLEASURE IN DOING THINGS: NOT AT ALL
2. FEELING DOWN, DEPRESSED OR HOPELESS: NOT AT ALL
SUM OF ALL RESPONSES TO PHQ9 QUESTIONS 1 AND 2: 0

## 2024-04-15 NOTE — PROGRESS NOTES
"Subjective   Patient ID: Ramesh Amaya is a 38 y.o. male who presents for Establish Care (BP, DIVERTICULITUS).    HPI   No family history of blood pressure.  His diet and lifestyle has not been great, and he has a lot of stress at work.  But with his significant episode of diverticular disease with the abscess, he is really working on changes diet caffeine and salt etc.  His blood pressure is coming down, he wants to avoid pills.  No persistent headaches or tiredness before he was sick.  Labs and CT were generally okay, nothing needs follow-up at this time.  He is going to see Dr. Sippy in roughly 3 months to get a colonoscopy.  He will monitor his blood pressure at home, if he is consistently getting over 140/90 he will make an office visit.  Office visit 6 months    Also discoid lupus, just uses topicals as needed.    Review of Systems   Constitutional:  Negative for fatigue.   Eyes:  Negative for visual disturbance.   Respiratory:  Negative for cough and shortness of breath.    Cardiovascular:  Negative for chest pain and palpitations.   Gastrointestinal:  Negative for abdominal pain, constipation, diarrhea and nausea.   Musculoskeletal:  Negative for arthralgias and myalgias.   Skin:  Positive for rash.   Neurological:  Negative for headaches.   Psychiatric/Behavioral:  Negative for sleep disturbance. The patient is not nervous/anxious.        Objective   /89   Pulse 90   Ht 1.702 m (5' 7\")   Wt 86.3 kg (190 lb 4.8 oz)   SpO2 99%   BMI 29.81 kg/m²     Physical Exam  Constitutional:       Appearance: Normal appearance.   HENT:      Head: Normocephalic and atraumatic.   Cardiovascular:      Rate and Rhythm: Normal rate and regular rhythm.      Heart sounds: Normal heart sounds.   Pulmonary:      Effort: Pulmonary effort is normal.      Breath sounds: Normal breath sounds.   Skin:     General: Skin is warm and dry.   Neurological:      General: No focal deficit present.      Mental Status: He is alert " and oriented to person, place, and time.   Psychiatric:         Mood and Affect: Mood normal.         Behavior: Behavior normal.         Thought Content: Thought content normal.         Judgment: Judgment normal.         Assessment/Plan   Problem List Items Addressed This Visit             ICD-10-CM    Blood pressure elevated without history of HTN - Primary R03.0    Discoid lupus L93.0

## 2024-07-02 ENCOUNTER — APPOINTMENT (OUTPATIENT)
Dept: OPERATING ROOM | Facility: HOSPITAL | Age: 39
End: 2024-07-02
Payer: COMMERCIAL

## 2024-07-26 ENCOUNTER — OFFICE VISIT (OUTPATIENT)
Dept: PRIMARY CARE | Facility: CLINIC | Age: 39
End: 2024-07-26
Payer: COMMERCIAL

## 2024-07-26 VITALS
HEART RATE: 80 BPM | DIASTOLIC BLOOD PRESSURE: 84 MMHG | HEIGHT: 67 IN | OXYGEN SATURATION: 97 % | WEIGHT: 181.21 LBS | SYSTOLIC BLOOD PRESSURE: 140 MMHG | BODY MASS INDEX: 28.44 KG/M2

## 2024-07-26 DIAGNOSIS — H10.32 ACUTE CONJUNCTIVITIS OF LEFT EYE, UNSPECIFIED ACUTE CONJUNCTIVITIS TYPE: Primary | ICD-10-CM

## 2024-07-26 PROCEDURE — 99213 OFFICE O/P EST LOW 20 MIN: CPT | Performed by: FAMILY MEDICINE

## 2024-07-26 PROCEDURE — 4004F PT TOBACCO SCREEN RCVD TLK: CPT | Performed by: FAMILY MEDICINE

## 2024-07-26 PROCEDURE — 3008F BODY MASS INDEX DOCD: CPT | Performed by: FAMILY MEDICINE

## 2024-07-26 RX ORDER — POLYMYXIN B SULFATE AND TRIMETHOPRIM 1; 10000 MG/ML; [USP'U]/ML
1 SOLUTION OPHTHALMIC EVERY 4 HOURS
Qty: 10 ML | Refills: 0 | Status: SHIPPED | OUTPATIENT
Start: 2024-07-26 | End: 2024-08-02

## 2024-07-26 ASSESSMENT — PATIENT HEALTH QUESTIONNAIRE - PHQ9
SUM OF ALL RESPONSES TO PHQ9 QUESTIONS 1 AND 2: 0
1. LITTLE INTEREST OR PLEASURE IN DOING THINGS: NOT AT ALL
2. FEELING DOWN, DEPRESSED OR HOPELESS: NOT AT ALL

## 2024-07-26 ASSESSMENT — ENCOUNTER SYMPTOMS
EYE DISCHARGE: 0
EYE PAIN: 0
SORE THROAT: 0
COUGH: 0
EYE ITCHING: 1
RHINORRHEA: 0
EYE REDNESS: 1
PHOTOPHOBIA: 0
NAUSEA: 0

## 2024-07-26 NOTE — PROGRESS NOTES
"Subjective   Patient ID: Ramesh Amaya is a 39 y.o. male who presents for Eye Problem (Left eye redness, watery, puffiness ).    HPI   This morning woke up with a pink swollen left eye.  Even by the end of the day it is feeling better.  He does have some allergies but not been urinating recently.  No troubles with the right eye.    On exam he has a little redness of the left sclera, the upper and lower conjunctiva red consistent with conjunctivitis.    Wash hands frequently  Try Naphcon-A for the next couple days for comfort.    If things or not better in 2 or 3 days or get worse, can start the antibiotic.    Review of Systems   HENT:  Negative for congestion, rhinorrhea and sore throat.    Eyes:  Positive for redness and itching. Negative for photophobia, pain, discharge and visual disturbance.   Respiratory:  Negative for cough.    Gastrointestinal:  Negative for nausea.       Objective   /84   Pulse 80   Ht 1.702 m (5' 7\")   Wt 82.2 kg (181 lb 3.4 oz)   SpO2 97%   BMI 28.38 kg/m²     Physical Exam  Constitutional:       Appearance: Normal appearance.   Eyes:      General:         Right eye: No discharge.         Left eye: No discharge.      Extraocular Movements: Extraocular movements intact.      Conjunctiva/sclera:      Right eye: Right conjunctiva is not injected.      Left eye: Left conjunctiva is injected.      Pupils: Pupils are equal, round, and reactive to light.   Skin:     General: Skin is warm and dry.   Neurological:      General: No focal deficit present.      Mental Status: He is alert and oriented to person, place, and time.   Psychiatric:         Mood and Affect: Mood normal.         Behavior: Behavior normal.         Judgment: Judgment normal.         Assessment/Plan   Problem List Items Addressed This Visit    None  Visit Diagnoses         Codes    Acute conjunctivitis of left eye, unspecified acute conjunctivitis type    -  Primary H10.32    Relevant Medications    polymyxin B " sulf-trimethoprim (Polytrim) ophthalmic solution

## 2024-08-29 ENCOUNTER — APPOINTMENT (OUTPATIENT)
Dept: SURGERY | Facility: CLINIC | Age: 39
End: 2024-08-29
Payer: COMMERCIAL

## 2024-08-29 ENCOUNTER — OFFICE VISIT (OUTPATIENT)
Dept: SURGERY | Facility: CLINIC | Age: 39
End: 2024-08-29
Payer: COMMERCIAL

## 2024-08-29 ENCOUNTER — PREP FOR PROCEDURE (OUTPATIENT)
Dept: SURGERY | Facility: HOSPITAL | Age: 39
End: 2024-08-29

## 2024-08-29 VITALS
HEIGHT: 67 IN | DIASTOLIC BLOOD PRESSURE: 100 MMHG | SYSTOLIC BLOOD PRESSURE: 150 MMHG | BODY MASS INDEX: 28.41 KG/M2 | WEIGHT: 181 LBS | HEART RATE: 81 BPM

## 2024-08-29 DIAGNOSIS — K57.92 DIVERTICULITIS: Primary | ICD-10-CM

## 2024-08-29 PROCEDURE — 99214 OFFICE O/P EST MOD 30 MIN: CPT | Performed by: SURGERY

## 2024-08-29 PROCEDURE — 4004F PT TOBACCO SCREEN RCVD TLK: CPT | Performed by: SURGERY

## 2024-08-29 PROCEDURE — 3008F BODY MASS INDEX DOCD: CPT | Performed by: SURGERY

## 2024-08-29 NOTE — PROGRESS NOTES
General Surgery Follow-up Visit    Patient: Ramesh Amaya  : 1985  MRN: [id number]  Date of Visit: 24    Chief Complaint: Diverticulitis    History of Present Illness: Ramesh Amaya is a 38 y.o. old male who I previously treated for perforated diverticulitis with an abscess back in March.  This resolved with IR drainage.  He presents today to discuss follow-up colonoscopy.  He was previously scheduled for colonoscopy in July.  However, couple weeks prior he ate corn and had left lower quadrant abdominal pain.  He therefore canceled that procedure.  He rescheduled once more, but also canceled.  He has a lot of anxiety and is concerned about the risk of perforation.  He continues to have what are likely symptoms of smoldering diverticulitis.  About twice per month he gets episodes of diarrhea the last for 2 or 3 days.  He denies any blood in the stool, but if he has several loose bowel movements in a row he will occasionally see a small amount of blood on tissue paper with wiping that he associates with inflammation of hemorrhoids.  He has bilateral lower abdominal discomfort during this time of diarrhea as well.  He has lost some weight over the summer and BMI is down to 28.     Medical History:  Diverticulitis  Asthma  Cutaneous lupus  Overweight, BMI 28     Surgical History:  No previous abdominal surgery.   No previous colonoscopy.     Home Medications:  Prior to Admission medications    Not on File     Allergies:  is allergic to peanut.    Family History:   No family history of colon or rectal cancer or inflammatory bowel disease.    Social History:  Smoker.     ROS:  Constitutional: No fever, sweats or chills  Cardiovascular: No chest pain  Respiratory: No cough or shortness of breath  Gastrointestinal: + LLQ pain (currently resolved), intermittent diarrhea, occasional bright red blood on tissue paper with wiping and inflamed perianal tissue  Genitourinary: Prepubic discomfort  "resolved  Musculoskeletal: no weakness or swelling  Integumentary: + Discoid lupus, on topical steroids intermittently but uses these judiciously given sun sensitivity  Neurological: no confusion  Endocrine: no heat or cold intolerance  Heme/Lymph: no easy bruising or bleeding    Objective:  BP (!) 150/100   Pulse 81   Ht 1.702 m (5' 7\")   Wt 82.1 kg (181 lb)   BMI 28.35 kg/m²     Physical Exam:  Constitutional: No acute distress, conversant, pleasant  Neurologic: alert and oriented  Psych: appropriate affect  Ears, Nose, Mouth and Throat: mucus membranes moist  Pulmonary: No labored breathing  Cardiovascular: Regular rate and rhythm  Abdomen: soft, non-distended, BMI 28, non-tender  Musculoskeletal: Moves all extremities, no edema  Skin: no jaundice     Labs/Imaging:  No new labs or imaging.    Assessment and Plan: Ramesh Amaya is a 39 y.o. old male with history of perforated diverticulitis.  He has completely recovered from that acute episode, which was about 5 months ago.  He continues to have what I suspect is a smoldering case of diverticulitis.  I have again offered colonoscopy.  We again discussed risks of bleeding, perforation, missed polyps, incomplete colonoscopy, and potential need for additional procedures pending findings.  We discussed that with his recurring symptoms, we could consider an elective sigmoid colectomy, but would need colonoscopy as the next step in evaluation regardless.  We discussed that we could perform the colonoscopy at [facility] with the assistance of Anesthesiology so that he could have a deeper sedation.  We discussed that if it is too inflamed to safely proceed, we would abort attempts to get past the sigmoid colon.  He is going to think about this.  He will call the office if he would like to be scheduled for colonoscopy.    Marcella Han MD  8/29/2024    "

## 2024-08-29 NOTE — LETTER
2024     Mac Mackenzie MD  [address]  [facility] [address]    Patient: Ramesh Amaya   YOB: 1985   Date of Visit: 2024       Dear Dr. Mac Mackenzie MD:    Thank you for referring Ramesh Amaya to me for evaluation. Below are my notes for this consultation.  If you have questions, please do not hesitate to call me. I look forward to following your patient along with you.       Sincerely,     Marcella Han MD      CC: No Recipients  ______________________________________________________________________________________    General Surgery Follow-up Visit    Patient: Ramesh Amaya  : 1985  MRN: [id number]  Date of Visit: 24    Chief Complaint: Diverticulitis    History of Present Illness: Ramesh Amaya is a 38 y.o. old male who I previously treated for perforated diverticulitis with an abscess back in March.  This resolved with IR drainage.  He presents today to discuss follow-up colonoscopy.  He was previously scheduled for colonoscopy in July.  However, couple weeks prior he ate corn and had left lower quadrant abdominal pain.  He therefore canceled that procedure.  He rescheduled once more, but also canceled.  He has a lot of anxiety and is concerned about the risk of perforation.  He continues to have what are likely symptoms of smoldering diverticulitis.  About twice per month he gets episodes of diarrhea the last for 2 or 3 days.  He denies any blood in the stool, but if he has several loose bowel movements in a row he will occasionally see a small amount of blood on tissue paper with wiping that he associates with inflammation of hemorrhoids.  He has bilateral lower abdominal discomfort during this time of diarrhea as well.  He has lost some weight over the summer and BMI is down to 28.     Medical History:  Diverticulitis  Asthma  Cutaneous lupus  Overweight, BMI 28     Surgical History:  No previous abdominal surgery.   No previous colonoscopy.  "    Home Medications:  Prior to Admission medications    Not on File     Allergies:  is allergic to peanut.    Family History:   No family history of colon or rectal cancer or inflammatory bowel disease.    Social History:  Smoker.     ROS:  Constitutional: No fever, sweats or chills  Cardiovascular: No chest pain  Respiratory: No cough or shortness of breath  Gastrointestinal: + LLQ pain (currently resolved), intermittent diarrhea, occasional bright red blood on tissue paper with wiping and inflamed perianal tissue  Genitourinary: Prepubic discomfort resolved  Musculoskeletal: no weakness or swelling  Integumentary: + Discoid lupus, on topical steroids intermittently but uses these judiciously given sun sensitivity  Neurological: no confusion  Endocrine: no heat or cold intolerance  Heme/Lymph: no easy bruising or bleeding    Objective:  BP (!) 150/100   Pulse 81   Ht 1.702 m (5' 7\")   Wt 82.1 kg (181 lb)   BMI 28.35 kg/m²     Physical Exam:  Constitutional: No acute distress, conversant, pleasant  Neurologic: alert and oriented  Psych: appropriate affect  Ears, Nose, Mouth and Throat: mucus membranes moist  Pulmonary: No labored breathing  Cardiovascular: Regular rate and rhythm  Abdomen: soft, non-distended, BMI 28, non-tender  Musculoskeletal: Moves all extremities, no edema  Skin: no jaundice     Labs/Imaging:  No new labs or imaging.    Assessment and Plan: Ramesh Amaya is a 39 y.o. old male with history of perforated diverticulitis.  He has completely recovered from that acute episode, which was about 5 months ago.  He continues to have what I suspect is a smoldering case of diverticulitis.  I have again offered colonoscopy.  We again discussed risks of bleeding, perforation, missed polyps, incomplete colonoscopy, and potential need for additional procedures pending findings.  We discussed that with his recurring symptoms, we could consider an elective sigmoid colectomy, but would need colonoscopy as " the next step in evaluation regardless.  We discussed that we could perform the colonoscopy at [facility] with the assistance of Anesthesiology so that he could have a deeper sedation.  We discussed that if it is too inflamed to safely proceed, we would abort attempts to get past the sigmoid colon.  He is going to think about this.  He will call the office if he would like to be scheduled for colonoscopy.    Marcella Han MD  8/29/2024

## 2024-10-15 ENCOUNTER — APPOINTMENT (OUTPATIENT)
Dept: PRIMARY CARE | Facility: CLINIC | Age: 39
End: 2024-10-15
Payer: COMMERCIAL

## 2024-10-23 ENCOUNTER — OFFICE VISIT (OUTPATIENT)
Age: 39
End: 2024-10-23
Payer: COMMERCIAL

## 2024-10-23 VITALS
SYSTOLIC BLOOD PRESSURE: 160 MMHG | TEMPERATURE: 97.2 F | DIASTOLIC BLOOD PRESSURE: 100 MMHG | BODY MASS INDEX: 28.41 KG/M2 | HEIGHT: 67 IN | WEIGHT: 181 LBS | HEART RATE: 88 BPM

## 2024-10-23 DIAGNOSIS — J00 COMMON COLD: Primary | ICD-10-CM

## 2024-10-23 DIAGNOSIS — F17.200 TOBACCO DEPENDENCY: ICD-10-CM

## 2024-10-23 PROCEDURE — 99213 OFFICE O/P EST LOW 20 MIN: CPT | Performed by: FAMILY MEDICINE

## 2024-10-23 PROCEDURE — 3008F BODY MASS INDEX DOCD: CPT | Performed by: FAMILY MEDICINE

## 2024-10-23 PROCEDURE — 4004F PT TOBACCO SCREEN RCVD TLK: CPT | Performed by: FAMILY MEDICINE

## 2024-10-23 RX ORDER — PREDNISONE 20 MG/1
40 TABLET ORAL DAILY
Qty: 10 TABLET | Refills: 0 | Status: SHIPPED | OUTPATIENT
Start: 2024-10-23 | End: 2024-10-28

## 2024-10-23 RX ORDER — AZITHROMYCIN 500 MG/1
500 TABLET, FILM COATED ORAL DAILY
Qty: 5 TABLET | Refills: 0 | Status: SHIPPED | OUTPATIENT
Start: 2024-10-23 | End: 2024-10-28

## 2024-10-23 NOTE — PROGRESS NOTES
Chest congestion and cough x 3-4 days; states both his children have been sick recently (one of them was dx with pnemonia)

## 2024-10-23 NOTE — PROGRESS NOTES
Subjective  Ramesh Amaya is a 39 y.o. male who presents for Chest congestion.  HPI  Pt of Dr Mackenzie, four days of cough and cold.  Smokes, aware of risks, cessation advised.  Kids were sick.  Not short of breath, no fevers.  Started with nasal congestion, now Cough.  Has not taken any otc meds.  Note blood pressure elevation, recommend routine follow appt soon to follow up on this.  Review of Systems   All other systems reviewed and are negative.  .    Allergies   Allergen Reactions    Peanut GI Upset       No current outpatient medications on file prior to visit.     No current facility-administered medications on file prior to visit.       Patient Active Problem List   Diagnosis    Perforation of sigmoid colon due to diverticulitis    Blood pressure elevated without history of HTN    Discoid lupus       Objective     Visit Vitals  BP (!) 160/100 (BP Location: Left arm, Patient Position: Sitting)   Pulse 88   Temp 36.2 °C (97.2 °F) (Oral)      Physical Exam  Vitals reviewed.   Constitutional:       General: He is not in acute distress.     Appearance: He is not ill-appearing.   Cardiovascular:      Rate and Rhythm: Normal rate and regular rhythm.   Pulmonary:      Effort: Pulmonary effort is normal.      Breath sounds: Normal breath sounds.   Neurological:      General: No focal deficit present.      Mental Status: He is alert.         Assessment/Plan   Problem List Items Addressed This Visit    None  Visit Diagnoses       Common cold    -  Primary    Relevant Medications    predniSONE (Deltasone) 20 mg tablet    azithromycin (Zithromax) 500 mg tablet    Tobacco dependency              I think this is most certainly viral, although he is higher risk with the smoking.  Recommend he take the five days of prednisone to help with cough and only if persists worsens take the antibiotic.  Needs followup with Dr Mackenzie rescheduled.          Lizbeth Woods MD